# Patient Record
Sex: FEMALE | Race: BLACK OR AFRICAN AMERICAN | NOT HISPANIC OR LATINO | Employment: OTHER | URBAN - METROPOLITAN AREA
[De-identification: names, ages, dates, MRNs, and addresses within clinical notes are randomized per-mention and may not be internally consistent; named-entity substitution may affect disease eponyms.]

---

## 2017-08-30 LAB — CRC RECOMMENDATION EXT: NORMAL

## 2019-05-07 ENCOUNTER — HISTORICAL (OUTPATIENT)
Dept: ADMINISTRATIVE | Facility: HOSPITAL | Age: 69
End: 2019-05-07

## 2019-05-07 LAB
T3RU NFR SERPL: 30 % (ref 31–39)
T4 SERPL-MCNC: 18.6 MCG/DL (ref 4.8–13.9)
TSH SERPL-ACNC: 0.93 MIU/L (ref 0.36–3.74)

## 2020-06-08 ENCOUNTER — HISTORICAL (OUTPATIENT)
Dept: RADIOLOGY | Facility: HOSPITAL | Age: 70
End: 2020-06-08

## 2020-06-18 ENCOUNTER — HISTORICAL (OUTPATIENT)
Dept: RADIOLOGY | Facility: HOSPITAL | Age: 70
End: 2020-06-18

## 2020-06-23 ENCOUNTER — HISTORICAL (OUTPATIENT)
Dept: RADIOLOGY | Facility: HOSPITAL | Age: 70
End: 2020-06-23

## 2020-07-20 ENCOUNTER — HISTORICAL (OUTPATIENT)
Dept: ADMINISTRATIVE | Facility: HOSPITAL | Age: 70
End: 2020-07-20

## 2020-07-20 LAB
ABS NEUT (OLG): 3.77 X10(3)/MCL (ref 2.1–9.2)
ALBUMIN SERPL-MCNC: 3.6 GM/DL (ref 3.4–4.8)
ALBUMIN/GLOB SERPL: 1.1 RATIO (ref 1.1–2)
ALP SERPL-CCNC: 100 UNIT/L (ref 40–150)
ALT SERPL-CCNC: 24 UNIT/L (ref 0–55)
AST SERPL-CCNC: 30 UNIT/L (ref 5–34)
BASOPHILS # BLD AUTO: 0 X10(3)/MCL (ref 0–0.2)
BASOPHILS NFR BLD AUTO: 0.4 %
BILIRUB SERPL-MCNC: 0.5 MG/DL
BILIRUBIN DIRECT+TOT PNL SERPL-MCNC: 0.2 MG/DL (ref 0–0.5)
BILIRUBIN DIRECT+TOT PNL SERPL-MCNC: 0.3 MG/DL (ref 0–0.8)
BUN SERPL-MCNC: 8.4 MG/DL (ref 9.8–20.1)
CALCIUM SERPL-MCNC: 9.2 MG/DL (ref 8.4–10.2)
CHLORIDE SERPL-SCNC: 100 MMOL/L (ref 98–107)
CO2 SERPL-SCNC: 30 MMOL/L (ref 23–31)
CREAT SERPL-MCNC: 0.68 MG/DL (ref 0.55–1.02)
EOSINOPHIL # BLD AUTO: 0.2 X10(3)/MCL (ref 0–0.9)
EOSINOPHIL NFR BLD AUTO: 2.5 %
ERYTHROCYTE [DISTWIDTH] IN BLOOD BY AUTOMATED COUNT: 11.7 % (ref 11.5–17)
GLOBULIN SER-MCNC: 3.2 GM/DL (ref 2.4–3.5)
GLUCOSE SERPL-MCNC: 108 MG/DL (ref 82–115)
HCT VFR BLD AUTO: 36.5 % (ref 37–47)
HGB BLD-MCNC: 11.7 GM/DL (ref 12–16)
LYMPHOCYTES # BLD AUTO: 2.4 X10(3)/MCL (ref 0.6–4.6)
LYMPHOCYTES NFR BLD AUTO: 33.7 %
MCH RBC QN AUTO: 30.4 PG (ref 27–31)
MCHC RBC AUTO-ENTMCNC: 32.1 GM/DL (ref 33–36)
MCV RBC AUTO: 94.8 FL (ref 80–94)
MONOCYTES # BLD AUTO: 0.8 X10(3)/MCL (ref 0.1–1.3)
MONOCYTES NFR BLD AUTO: 11.3 %
NEUTROPHILS # BLD AUTO: 3.8 X10(3)/MCL (ref 2.1–9.2)
NEUTROPHILS NFR BLD AUTO: 52 %
PLATELET # BLD AUTO: 243 X10(3)/MCL (ref 130–400)
PMV BLD AUTO: 9.1 FL (ref 9.4–12.4)
POTASSIUM SERPL-SCNC: 3.4 MMOL/L (ref 3.5–5.1)
PROT SERPL-MCNC: 6.8 GM/DL (ref 5.8–7.6)
RBC # BLD AUTO: 3.85 X10(6)/MCL (ref 4.2–5.4)
SODIUM SERPL-SCNC: 139 MMOL/L (ref 136–145)
TSH SERPL-ACNC: 0.77 UIU/ML (ref 0.35–4.94)
WBC # SPEC AUTO: 7.3 X10(3)/MCL (ref 4.5–11.5)

## 2020-07-30 ENCOUNTER — HISTORICAL (OUTPATIENT)
Dept: RADIOLOGY | Facility: HOSPITAL | Age: 70
End: 2020-07-30

## 2020-09-14 ENCOUNTER — HISTORICAL (OUTPATIENT)
Dept: RADIOLOGY | Facility: HOSPITAL | Age: 70
End: 2020-09-14

## 2020-09-14 LAB — POC CREATININE: 0.7 MG/DL (ref 0.6–1.3)

## 2020-10-07 LAB
ABS NEUT (OLG): 3.41 X10(3)/MCL (ref 2.1–9.2)
BASOPHILS # BLD AUTO: 0 X10(3)/MCL (ref 0–0.2)
BASOPHILS NFR BLD AUTO: 0 %
BUN SERPL-MCNC: 11.2 MG/DL (ref 9.8–20.1)
CALCIUM SERPL-MCNC: 8.9 MG/DL (ref 8.4–10.2)
CHLORIDE SERPL-SCNC: 100 MMOL/L (ref 98–107)
CO2 SERPL-SCNC: 29 MMOL/L (ref 23–31)
CREAT SERPL-MCNC: 0.76 MG/DL (ref 0.55–1.02)
CREAT/UREA NIT SERPL: 15
EOSINOPHIL # BLD AUTO: 0.1 X10(3)/MCL (ref 0–0.9)
EOSINOPHIL NFR BLD AUTO: 2 %
ERYTHROCYTE [DISTWIDTH] IN BLOOD BY AUTOMATED COUNT: 12.1 % (ref 11.5–17)
GLUCOSE SERPL-MCNC: 93 MG/DL (ref 82–115)
HCT VFR BLD AUTO: 37.9 % (ref 37–47)
HGB BLD-MCNC: 12 GM/DL (ref 12–16)
LYMPHOCYTES # BLD AUTO: 2.1 X10(3)/MCL (ref 0.6–4.6)
LYMPHOCYTES NFR BLD AUTO: 33 %
MCH RBC QN AUTO: 30.7 PG (ref 27–31)
MCHC RBC AUTO-ENTMCNC: 31.7 GM/DL (ref 33–36)
MCV RBC AUTO: 96.9 FL (ref 80–94)
MONOCYTES # BLD AUTO: 0.7 X10(3)/MCL (ref 0.1–1.3)
MONOCYTES NFR BLD AUTO: 11 %
NEUTROPHILS # BLD AUTO: 3.41 X10(3)/MCL (ref 2.1–9.2)
NEUTROPHILS NFR BLD AUTO: 53 %
PLATELET # BLD AUTO: 202 X10(3)/MCL (ref 130–400)
PMV BLD AUTO: 11.2 FL (ref 9.4–12.4)
POTASSIUM SERPL-SCNC: 3.6 MMOL/L (ref 3.5–5.1)
RBC # BLD AUTO: 3.91 X10(6)/MCL (ref 4.2–5.4)
SODIUM SERPL-SCNC: 140 MMOL/L (ref 136–145)
WBC # SPEC AUTO: 6.4 X10(3)/MCL (ref 4.5–11.5)

## 2020-10-12 ENCOUNTER — HISTORICAL (OUTPATIENT)
Dept: SURGERY | Facility: HOSPITAL | Age: 70
End: 2020-10-12

## 2020-10-14 ENCOUNTER — HISTORICAL (OUTPATIENT)
Dept: RADIOLOGY | Facility: HOSPITAL | Age: 70
End: 2020-10-14

## 2021-02-01 ENCOUNTER — HISTORICAL (OUTPATIENT)
Dept: RADIOLOGY | Facility: HOSPITAL | Age: 71
End: 2021-02-01

## 2021-02-18 LAB
ABS NEUT (OLG): 6.47 X10(3)/MCL (ref 2.1–9.2)
BASOPHILS # BLD AUTO: 0 X10(3)/MCL (ref 0–0.2)
BASOPHILS NFR BLD AUTO: 0 %
BUN SERPL-MCNC: 6.8 MG/DL (ref 9.8–20.1)
CALCIUM SERPL-MCNC: 9.4 MG/DL (ref 8.4–10.2)
CHLORIDE SERPL-SCNC: 98 MMOL/L (ref 98–107)
CO2 SERPL-SCNC: 28 MMOL/L (ref 23–31)
CREAT SERPL-MCNC: 0.73 MG/DL (ref 0.55–1.02)
CREAT/UREA NIT SERPL: 9
EOSINOPHIL # BLD AUTO: 0.2 X10(3)/MCL (ref 0–0.9)
EOSINOPHIL NFR BLD AUTO: 2 %
ERYTHROCYTE [DISTWIDTH] IN BLOOD BY AUTOMATED COUNT: 13.5 % (ref 11.5–17)
GLUCOSE SERPL-MCNC: 94 MG/DL (ref 82–115)
HCT VFR BLD AUTO: 34.1 % (ref 37–47)
HGB BLD-MCNC: 10.5 GM/DL (ref 12–16)
LYMPHOCYTES # BLD AUTO: 1.7 X10(3)/MCL (ref 0.6–4.6)
LYMPHOCYTES NFR BLD AUTO: 18 %
MCH RBC QN AUTO: 31.4 PG (ref 27–31)
MCHC RBC AUTO-ENTMCNC: 30.8 GM/DL (ref 33–36)
MCV RBC AUTO: 102.1 FL (ref 80–94)
MONOCYTES # BLD AUTO: 1 X10(3)/MCL (ref 0.1–1.3)
MONOCYTES NFR BLD AUTO: 11 %
NEUTROPHILS # BLD AUTO: 6.47 X10(3)/MCL (ref 2.1–9.2)
NEUTROPHILS NFR BLD AUTO: 68 %
PLATELET # BLD AUTO: 284 X10(3)/MCL (ref 130–400)
PMV BLD AUTO: 10.1 FL (ref 9.4–12.4)
POTASSIUM SERPL-SCNC: 3.4 MMOL/L (ref 3.5–5.1)
RBC # BLD AUTO: 3.34 X10(6)/MCL (ref 4.2–5.4)
SODIUM SERPL-SCNC: 136 MMOL/L (ref 136–145)
WBC # SPEC AUTO: 9.4 X10(3)/MCL (ref 4.5–11.5)

## 2021-02-22 ENCOUNTER — HISTORICAL (OUTPATIENT)
Dept: SURGERY | Facility: HOSPITAL | Age: 71
End: 2021-02-22

## 2021-03-21 ENCOUNTER — HOSPITAL ENCOUNTER (OUTPATIENT)
Facility: HOSPITAL | Age: 71
Discharge: HOME | End: 2021-03-22
Attending: EMERGENCY MEDICINE | Admitting: EMERGENCY MEDICINE
Payer: MEDICARE

## 2021-03-21 DIAGNOSIS — I26.99 PULMONARY EMBOLISM: ICD-10-CM

## 2021-03-21 DIAGNOSIS — R07.9 CHEST PAIN: ICD-10-CM

## 2021-03-21 DIAGNOSIS — E87.6 HYPOKALEMIA: ICD-10-CM

## 2021-03-21 DIAGNOSIS — I26.99 ACUTE PULMONARY EMBOLISM WITHOUT ACUTE COR PULMONALE, UNSPECIFIED PULMONARY EMBOLISM TYPE: Primary | ICD-10-CM
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
## 2021-03-21 DIAGNOSIS — E83.42 HYPOMAGNESEMIA: ICD-10-CM

## 2021-03-21 PROBLEM — R29.6 FALLS: Status: ACTIVE | Noted: 2021-03-21

## 2021-03-21 LAB
ALBUMIN SERPL BCP-MCNC: 3.1 G/DL (ref 3.5–5.2)
ALP SERPL-CCNC: 100 U/L (ref 55–135)
ALT SERPL W/O P-5'-P-CCNC: 27 U/L (ref 10–44)
AMPHET+METHAMPHET UR QL: NEGATIVE
ANION GAP SERPL CALC-SCNC: 10 MMOL/L (ref 8–16)
AST SERPL-CCNC: 25 U/L (ref 10–40)
BARBITURATES UR QL SCN>200 NG/ML: NEGATIVE
BASOPHILS # BLD AUTO: 0.04 K/UL (ref 0–0.2)
BASOPHILS NFR BLD: 0.6 % (ref 0–1.9)
BENZODIAZ UR QL SCN>200 NG/ML: NEGATIVE
BILIRUB SERPL-MCNC: 0.3 MG/DL (ref 0.1–1)
BILIRUB UR QL STRIP: NEGATIVE
BNP SERPL-MCNC: 11 PG/ML (ref 0–99)
BUN SERPL-MCNC: 6 MG/DL (ref 8–23)
BZE UR QL SCN: NEGATIVE
CALCIUM SERPL-MCNC: 9.7 MG/DL (ref 8.7–10.5)
CANNABINOIDS UR QL SCN: NEGATIVE
CHLORIDE SERPL-SCNC: 102 MMOL/L (ref 95–110)
CK SERPL-CCNC: 137 U/L (ref 20–180)
CLARITY UR REFRACT.AUTO: CLEAR
CO2 SERPL-SCNC: 24 MMOL/L (ref 23–29)
COLOR UR AUTO: COLORLESS
CREAT SERPL-MCNC: 0.8 MG/DL (ref 0.5–1.4)
CREAT UR-MCNC: 16 MG/DL (ref 15–325)
CTP QC/QA: YES
D DIMER PPP IA.FEU-MCNC: 10.6 MG/L FEU
DIFFERENTIAL METHOD: ABNORMAL
EOSINOPHIL # BLD AUTO: 0.3 K/UL (ref 0–0.5)
EOSINOPHIL NFR BLD: 5.2 % (ref 0–8)
ERYTHROCYTE [DISTWIDTH] IN BLOOD BY AUTOMATED COUNT: 12.9 % (ref 11.5–14.5)
EST. GFR  (AFRICAN AMERICAN): >60 ML/MIN/1.73 M^2
EST. GFR  (NON AFRICAN AMERICAN): >60 ML/MIN/1.73 M^2
ETHANOL SERPL-MCNC: <10 MG/DL
ETHANOL UR-MCNC: <10 MG/DL
GLUCOSE SERPL-MCNC: 110 MG/DL (ref 70–110)
GLUCOSE UR QL STRIP: NEGATIVE
HCT VFR BLD AUTO: 33 % (ref 37–48.5)
HGB BLD-MCNC: 10.7 G/DL (ref 12–16)
HGB UR QL STRIP: NEGATIVE
IMM GRANULOCYTES # BLD AUTO: 0.02 K/UL (ref 0–0.04)
IMM GRANULOCYTES NFR BLD AUTO: 0.3 % (ref 0–0.5)
KETONES UR QL STRIP: NEGATIVE
LEUKOCYTE ESTERASE UR QL STRIP: NEGATIVE
LYMPHOCYTES # BLD AUTO: 2.4 K/UL (ref 1–4.8)
LYMPHOCYTES NFR BLD: 37.2 % (ref 18–48)
MAGNESIUM SERPL-MCNC: 1.5 MG/DL (ref 1.6–2.6)
MCH RBC QN AUTO: 30.7 PG (ref 27–31)
MCHC RBC AUTO-ENTMCNC: 32.4 G/DL (ref 32–36)
MCV RBC AUTO: 95 FL (ref 82–98)
METHADONE UR QL SCN>300 NG/ML: NEGATIVE
MONOCYTES # BLD AUTO: 1 K/UL (ref 0.3–1)
MONOCYTES NFR BLD: 15.5 % (ref 4–15)
NEUTROPHILS # BLD AUTO: 2.7 K/UL (ref 1.8–7.7)
NEUTROPHILS NFR BLD: 41.2 % (ref 38–73)
NITRITE UR QL STRIP: NEGATIVE
NRBC BLD-RTO: 0 /100 WBC
OPIATES UR QL SCN: NEGATIVE
PCP UR QL SCN>25 NG/ML: NEGATIVE
PH UR STRIP: 8 [PH] (ref 5–8)
PLATELET # BLD AUTO: 274 K/UL (ref 150–350)
PMV BLD AUTO: 9.2 FL (ref 9.2–12.9)
POTASSIUM SERPL-SCNC: 3.2 MMOL/L (ref 3.5–5.1)
PROT SERPL-MCNC: 6.8 G/DL (ref 6–8.4)
PROT UR QL STRIP: NEGATIVE
RBC # BLD AUTO: 3.48 M/UL (ref 4–5.4)
SARS-COV-2 RDRP RESP QL NAA+PROBE: NEGATIVE
SODIUM SERPL-SCNC: 136 MMOL/L (ref 136–145)
SP GR UR STRIP: 1 (ref 1–1.03)
TOXICOLOGY INFORMATION: NORMAL
TROPONIN I SERPL DL<=0.01 NG/ML-MCNC: <0.006 NG/ML (ref 0–0.03)
TSH SERPL DL<=0.005 MIU/L-ACNC: 0.94 UIU/ML (ref 0.4–4)
URN SPEC COLLECT METH UR: ABNORMAL
WBC # BLD AUTO: 6.53 K/UL (ref 3.9–12.7)

## 2021-03-21 PROCEDURE — 25000242 PHARM REV CODE 250 ALT 637 W/ HCPCS: Performed by: STUDENT IN AN ORGANIZED HEALTH CARE EDUCATION/TRAINING PROGRAM

## 2021-03-21 PROCEDURE — G0378 HOSPITAL OBSERVATION PER HR: HCPCS

## 2021-03-21 PROCEDURE — 25500020 PHARM REV CODE 255: Performed by: EMERGENCY MEDICINE

## 2021-03-21 PROCEDURE — 99220 PR INITIAL OBSERVATION CARE,LEVL III: CPT | Mod: ,,, | Performed by: STUDENT IN AN ORGANIZED HEALTH CARE EDUCATION/TRAINING PROGRAM

## 2021-03-21 PROCEDURE — 36000 PLACE NEEDLE IN VEIN: CPT | Mod: 59

## 2021-03-21 PROCEDURE — 82077 ASSAY SPEC XCP UR&BREATH IA: CPT | Performed by: EMERGENCY MEDICINE

## 2021-03-21 PROCEDURE — 93010 EKG 12-LEAD: ICD-10-PCS | Mod: ,,, | Performed by: INTERNAL MEDICINE

## 2021-03-21 PROCEDURE — 84484 ASSAY OF TROPONIN QUANT: CPT | Performed by: EMERGENCY MEDICINE

## 2021-03-21 PROCEDURE — 93005 ELECTROCARDIOGRAM TRACING: CPT

## 2021-03-21 PROCEDURE — 86803 HEPATITIS C AB TEST: CPT | Performed by: EMERGENCY MEDICINE

## 2021-03-21 PROCEDURE — 84443 ASSAY THYROID STIM HORMONE: CPT | Performed by: EMERGENCY MEDICINE

## 2021-03-21 PROCEDURE — 25000003 PHARM REV CODE 250: Performed by: EMERGENCY MEDICINE

## 2021-03-21 PROCEDURE — 83735 ASSAY OF MAGNESIUM: CPT | Performed by: EMERGENCY MEDICINE

## 2021-03-21 PROCEDURE — 83880 ASSAY OF NATRIURETIC PEPTIDE: CPT | Performed by: EMERGENCY MEDICINE

## 2021-03-21 PROCEDURE — 99220 PR INITIAL OBSERVATION CARE,LEVL III: ICD-10-PCS | Mod: ,,, | Performed by: STUDENT IN AN ORGANIZED HEALTH CARE EDUCATION/TRAINING PROGRAM

## 2021-03-21 PROCEDURE — 82550 ASSAY OF CK (CPK): CPT | Performed by: EMERGENCY MEDICINE

## 2021-03-21 PROCEDURE — 94640 AIRWAY INHALATION TREATMENT: CPT

## 2021-03-21 PROCEDURE — 85379 FIBRIN DEGRADATION QUANT: CPT | Performed by: EMERGENCY MEDICINE

## 2021-03-21 PROCEDURE — U0002 COVID-19 LAB TEST NON-CDC: HCPCS | Performed by: EMERGENCY MEDICINE

## 2021-03-21 PROCEDURE — 81003 URINALYSIS AUTO W/O SCOPE: CPT | Performed by: EMERGENCY MEDICINE

## 2021-03-21 PROCEDURE — 80307 DRUG TEST PRSMV CHEM ANLYZR: CPT | Performed by: EMERGENCY MEDICINE

## 2021-03-21 PROCEDURE — 99285 EMERGENCY DEPT VISIT HI MDM: CPT | Mod: CS,,, | Performed by: EMERGENCY MEDICINE

## 2021-03-21 PROCEDURE — 99285 EMERGENCY DEPT VISIT HI MDM: CPT | Mod: 25

## 2021-03-21 PROCEDURE — 93010 ELECTROCARDIOGRAM REPORT: CPT | Mod: ,,, | Performed by: INTERNAL MEDICINE

## 2021-03-21 PROCEDURE — 85025 COMPLETE CBC W/AUTO DIFF WBC: CPT | Performed by: EMERGENCY MEDICINE

## 2021-03-21 PROCEDURE — 94761 N-INVAS EAR/PLS OXIMETRY MLT: CPT

## 2021-03-21 PROCEDURE — 80053 COMPREHEN METABOLIC PANEL: CPT | Performed by: EMERGENCY MEDICINE

## 2021-03-21 PROCEDURE — 25000003 PHARM REV CODE 250: Performed by: STUDENT IN AN ORGANIZED HEALTH CARE EDUCATION/TRAINING PROGRAM

## 2021-03-21 PROCEDURE — 99285 PR EMERGENCY DEPT VISIT,LEVEL V: ICD-10-PCS | Mod: CS,,, | Performed by: EMERGENCY MEDICINE

## 2021-03-21 RX ORDER — INDAPAMIDE 2.5 MG/1
2.5 TABLET ORAL DAILY
COMMUNITY
Start: 2021-03-16 | End: 2022-08-08 | Stop reason: SDUPTHER

## 2021-03-21 RX ORDER — INDAPAMIDE 2.5 MG/1
2.5 TABLET ORAL DAILY
Status: DISCONTINUED | OUTPATIENT
Start: 2021-03-21 | End: 2021-03-22 | Stop reason: HOSPADM

## 2021-03-21 RX ORDER — LORATADINE 10 MG/1
10 TABLET ORAL
Status: ON HOLD | COMMUNITY
Start: 2021-03-04 | End: 2021-03-22

## 2021-03-21 RX ORDER — IBUPROFEN 200 MG
16 TABLET ORAL
Status: DISCONTINUED | OUTPATIENT
Start: 2021-03-21 | End: 2021-03-22 | Stop reason: HOSPADM

## 2021-03-21 RX ORDER — SODIUM CHLORIDE 0.9 % (FLUSH) 0.9 %
10 SYRINGE (ML) INJECTION
Status: DISCONTINUED | OUTPATIENT
Start: 2021-03-21 | End: 2021-03-22 | Stop reason: HOSPADM

## 2021-03-21 RX ORDER — CETIRIZINE HYDROCHLORIDE 5 MG/1
10 TABLET ORAL DAILY
Status: DISCONTINUED | OUTPATIENT
Start: 2021-03-21 | End: 2021-03-22 | Stop reason: HOSPADM

## 2021-03-21 RX ORDER — OXYBUTYNIN CHLORIDE 5 MG/1
5 TABLET, EXTENDED RELEASE ORAL DAILY
Status: DISCONTINUED | OUTPATIENT
Start: 2021-03-21 | End: 2021-03-22 | Stop reason: HOSPADM

## 2021-03-21 RX ORDER — BENZTROPINE MESYLATE 0.5 MG/1
2 TABLET ORAL 2 TIMES DAILY
Status: DISCONTINUED | OUTPATIENT
Start: 2021-03-21 | End: 2021-03-22 | Stop reason: HOSPADM

## 2021-03-21 RX ORDER — METOPROLOL SUCCINATE 50 MG/1
50 TABLET, EXTENDED RELEASE ORAL DAILY
Status: DISCONTINUED | OUTPATIENT
Start: 2021-03-21 | End: 2021-03-22 | Stop reason: HOSPADM

## 2021-03-21 RX ORDER — GLUCAGON 1 MG
1 KIT INJECTION
Status: DISCONTINUED | OUTPATIENT
Start: 2021-03-21 | End: 2021-03-22 | Stop reason: HOSPADM

## 2021-03-21 RX ORDER — LANOLIN ALCOHOL/MO/W.PET/CERES
400 CREAM (GRAM) TOPICAL ONCE
Status: COMPLETED | OUTPATIENT
Start: 2021-03-21 | End: 2021-03-21

## 2021-03-21 RX ORDER — LOSARTAN POTASSIUM 50 MG/1
50 TABLET ORAL DAILY
COMMUNITY
Start: 2021-02-22 | End: 2022-05-10 | Stop reason: SDUPTHER

## 2021-03-21 RX ORDER — METOPROLOL SUCCINATE 50 MG/1
50 TABLET, EXTENDED RELEASE ORAL
COMMUNITY
Start: 2020-07-07 | End: 2022-05-10 | Stop reason: SDUPTHER

## 2021-03-21 RX ORDER — FLUTICASONE PROPIONATE 50 MCG
2 SPRAY, SUSPENSION (ML) NASAL DAILY
COMMUNITY
Start: 2021-01-27

## 2021-03-21 RX ORDER — MONTELUKAST SODIUM 10 MG/1
10 TABLET ORAL DAILY
COMMUNITY
Start: 2020-10-12 | End: 2022-05-10 | Stop reason: SDUPTHER

## 2021-03-21 RX ORDER — ATORVASTATIN CALCIUM 20 MG/1
40 TABLET, FILM COATED ORAL DAILY
Status: DISCONTINUED | OUTPATIENT
Start: 2021-03-21 | End: 2021-03-22 | Stop reason: HOSPADM

## 2021-03-21 RX ORDER — GABAPENTIN 300 MG/1
300 CAPSULE ORAL 3 TIMES DAILY
Status: DISCONTINUED | OUTPATIENT
Start: 2021-03-21 | End: 2021-03-22 | Stop reason: HOSPADM

## 2021-03-21 RX ORDER — TALC
6 POWDER (GRAM) TOPICAL NIGHTLY PRN
Status: DISCONTINUED | OUTPATIENT
Start: 2021-03-21 | End: 2021-03-22 | Stop reason: HOSPADM

## 2021-03-21 RX ORDER — PANTOPRAZOLE SODIUM 40 MG/1
40 TABLET, DELAYED RELEASE ORAL DAILY
COMMUNITY
Start: 2021-02-16 | End: 2022-05-10 | Stop reason: SDUPTHER

## 2021-03-21 RX ORDER — GABAPENTIN 300 MG/1
300 CAPSULE ORAL 3 TIMES DAILY
COMMUNITY
Start: 2020-07-07 | End: 2023-01-10

## 2021-03-21 RX ORDER — HALOPERIDOL 5 MG/1
5 TABLET ORAL 2 TIMES DAILY
COMMUNITY
Start: 2021-01-22

## 2021-03-21 RX ORDER — ATORVASTATIN CALCIUM 40 MG/1
40 TABLET, FILM COATED ORAL DAILY
COMMUNITY
Start: 2020-12-31 | End: 2022-08-08 | Stop reason: SDUPTHER

## 2021-03-21 RX ORDER — TRAZODONE HYDROCHLORIDE 50 MG/1
50 TABLET ORAL NIGHTLY PRN
Status: DISCONTINUED | OUTPATIENT
Start: 2021-03-21 | End: 2021-03-22 | Stop reason: HOSPADM

## 2021-03-21 RX ORDER — POTASSIUM CHLORIDE 750 MG/1
40 CAPSULE, EXTENDED RELEASE ORAL ONCE
Status: COMPLETED | OUTPATIENT
Start: 2021-03-21 | End: 2021-03-21

## 2021-03-21 RX ORDER — POTASSIUM CHLORIDE 20 MEQ/1
40 TABLET, EXTENDED RELEASE ORAL
Status: COMPLETED | OUTPATIENT
Start: 2021-03-21 | End: 2021-03-21

## 2021-03-21 RX ORDER — IBUPROFEN 200 MG
24 TABLET ORAL
Status: DISCONTINUED | OUTPATIENT
Start: 2021-03-21 | End: 2021-03-22 | Stop reason: HOSPADM

## 2021-03-21 RX ORDER — MONTELUKAST SODIUM 10 MG/1
10 TABLET ORAL DAILY
Status: DISCONTINUED | OUTPATIENT
Start: 2021-03-21 | End: 2021-03-22 | Stop reason: HOSPADM

## 2021-03-21 RX ORDER — FLUTICASONE FUROATE 100 UG/1
1 POWDER RESPIRATORY (INHALATION) DAILY
COMMUNITY
Start: 2020-07-07

## 2021-03-21 RX ORDER — PANTOPRAZOLE SODIUM 40 MG/1
40 TABLET, DELAYED RELEASE ORAL DAILY
Status: DISCONTINUED | OUTPATIENT
Start: 2021-03-21 | End: 2021-03-22 | Stop reason: HOSPADM

## 2021-03-21 RX ORDER — FLUTICASONE PROPIONATE 50 MCG
2 SPRAY, SUSPENSION (ML) NASAL DAILY
Status: DISCONTINUED | OUTPATIENT
Start: 2021-03-21 | End: 2021-03-22 | Stop reason: HOSPADM

## 2021-03-21 RX ORDER — BENZTROPINE MESYLATE 2 MG/1
2 TABLET ORAL NIGHTLY
Status: ON HOLD | COMMUNITY
Start: 2021-01-27 | End: 2023-01-25 | Stop reason: HOSPADM

## 2021-03-21 RX ORDER — HALOPERIDOL 5 MG/1
5 TABLET ORAL 2 TIMES DAILY
Status: DISCONTINUED | OUTPATIENT
Start: 2021-03-21 | End: 2021-03-22 | Stop reason: HOSPADM

## 2021-03-21 RX ORDER — LOSARTAN POTASSIUM 50 MG/1
50 TABLET ORAL DAILY
Status: DISCONTINUED | OUTPATIENT
Start: 2021-03-21 | End: 2021-03-22 | Stop reason: HOSPADM

## 2021-03-21 RX ADMIN — FLUTICASONE PROPIONATE 100 MCG: 50 SPRAY, METERED NASAL at 09:03

## 2021-03-21 RX ADMIN — HALOPERIDOL 5 MG: 5 TABLET ORAL at 09:03

## 2021-03-21 RX ADMIN — BENZTROPINE MESYLATE 2 MG: 0.5 TABLET ORAL at 09:03

## 2021-03-21 RX ADMIN — APIXABAN 10 MG: 5 TABLET, FILM COATED ORAL at 04:03

## 2021-03-21 RX ADMIN — PANTOPRAZOLE SODIUM 40 MG: 40 TABLET, DELAYED RELEASE ORAL at 09:03

## 2021-03-21 RX ADMIN — FLUTICASONE FUROATE 1 PUFF: 100 POWDER RESPIRATORY (INHALATION) at 09:03

## 2021-03-21 RX ADMIN — APIXABAN 10 MG: 5 TABLET, FILM COATED ORAL at 09:03

## 2021-03-21 RX ADMIN — MONTELUKAST 10 MG: 10 TABLET, FILM COATED ORAL at 09:03

## 2021-03-21 RX ADMIN — GABAPENTIN 300 MG: 300 CAPSULE ORAL at 09:03

## 2021-03-21 RX ADMIN — IOHEXOL 75 ML: 350 INJECTION, SOLUTION INTRAVENOUS at 03:03

## 2021-03-21 RX ADMIN — POTASSIUM CHLORIDE 40 MEQ: 10 CAPSULE, COATED, EXTENDED RELEASE ORAL at 09:03

## 2021-03-21 RX ADMIN — METOPROLOL SUCCINATE 50 MG: 50 TABLET, EXTENDED RELEASE ORAL at 09:03

## 2021-03-21 RX ADMIN — Medication 400 MG: at 03:03

## 2021-03-21 RX ADMIN — LOSARTAN POTASSIUM 50 MG: 50 TABLET, FILM COATED ORAL at 09:03

## 2021-03-21 RX ADMIN — CETIRIZINE HYDROCHLORIDE 10 MG: 5 TABLET ORAL at 09:03

## 2021-03-21 RX ADMIN — POTASSIUM CHLORIDE 40 MEQ: 1500 TABLET, EXTENDED RELEASE ORAL at 03:03

## 2021-03-21 RX ADMIN — GABAPENTIN 300 MG: 300 CAPSULE ORAL at 06:03

## 2021-03-21 RX ADMIN — ATORVASTATIN CALCIUM 40 MG: 20 TABLET, FILM COATED ORAL at 09:03

## 2021-03-21 RX ADMIN — INDAPAMIDE 2.5 MG: 2.5 TABLET, FILM COATED ORAL at 09:03

## 2021-03-21 RX ADMIN — OXYBUTYNIN CHLORIDE 5 MG: 5 TABLET, EXTENDED RELEASE ORAL at 09:03

## 2021-03-22 VITALS
WEIGHT: 186 LBS | BODY MASS INDEX: 28.19 KG/M2 | TEMPERATURE: 99 F | OXYGEN SATURATION: 95 % | HEART RATE: 81 BPM | RESPIRATION RATE: 18 BRPM | HEIGHT: 68 IN | DIASTOLIC BLOOD PRESSURE: 69 MMHG | SYSTOLIC BLOOD PRESSURE: 105 MMHG

## 2021-03-22 LAB
ALBUMIN SERPL BCP-MCNC: 2.7 G/DL (ref 3.5–5.2)
ALP SERPL-CCNC: 86 U/L (ref 55–135)
ALT SERPL W/O P-5'-P-CCNC: 19 U/L (ref 10–44)
ANION GAP SERPL CALC-SCNC: 10 MMOL/L (ref 8–16)
ASCENDING AORTA: 3.01 CM
AST SERPL-CCNC: 22 U/L (ref 10–40)
AV INDEX (PROSTH): 0.83
AV MEAN GRADIENT: 4 MMHG
AV PEAK GRADIENT: 7 MMHG
AV VALVE AREA: 2.7 CM2
AV VELOCITY RATIO: 0.84
BASOPHILS # BLD AUTO: 0.04 K/UL (ref 0–0.2)
BASOPHILS NFR BLD: 0.5 % (ref 0–1.9)
BILIRUB SERPL-MCNC: 0.3 MG/DL (ref 0.1–1)
BSA FOR ECHO PROCEDURE: 2.01 M2
BUN SERPL-MCNC: 12 MG/DL (ref 8–23)
CALCIUM SERPL-MCNC: 8.5 MG/DL (ref 8.7–10.5)
CHLORIDE SERPL-SCNC: 106 MMOL/L (ref 95–110)
CO2 SERPL-SCNC: 24 MMOL/L (ref 23–29)
CREAT SERPL-MCNC: 0.7 MG/DL (ref 0.5–1.4)
CV ECHO LV RWT: 0.33 CM
DIFFERENTIAL METHOD: ABNORMAL
DOP CALC AO PEAK VEL: 1.34 M/S
DOP CALC AO VTI: 26.49 CM
DOP CALC LVOT AREA: 3.2 CM2
DOP CALC LVOT DIAMETER: 2.03 CM
DOP CALC LVOT PEAK VEL: 1.12 M/S
DOP CALC LVOT STROKE VOLUME: 71.43 CM3
DOP CALCLVOT PEAK VEL VTI: 22.08 CM
E WAVE DECELERATION TIME: 173 MSEC
E/A RATIO: 0.71
E/E' RATIO: 8 M/S
ECHO LV POSTERIOR WALL: 0.82 CM (ref 0.6–1.1)
EOSINOPHIL # BLD AUTO: 0.4 K/UL (ref 0–0.5)
EOSINOPHIL NFR BLD: 4.4 % (ref 0–8)
ERYTHROCYTE [DISTWIDTH] IN BLOOD BY AUTOMATED COUNT: 13.2 % (ref 11.5–14.5)
EST. GFR  (AFRICAN AMERICAN): >60 ML/MIN/1.73 M^2
EST. GFR  (NON AFRICAN AMERICAN): >60 ML/MIN/1.73 M^2
FRACTIONAL SHORTENING: 32 % (ref 28–44)
GLUCOSE SERPL-MCNC: 96 MG/DL (ref 70–110)
HCT VFR BLD AUTO: 33.4 % (ref 37–48.5)
HCV AB SERPL QL IA: NEGATIVE
HGB BLD-MCNC: 10.2 G/DL (ref 12–16)
IMM GRANULOCYTES # BLD AUTO: 0.02 K/UL (ref 0–0.04)
IMM GRANULOCYTES NFR BLD AUTO: 0.2 % (ref 0–0.5)
INTERVENTRICULAR SEPTUM: 0.73 CM (ref 0.6–1.1)
IVRT: 119.89 MSEC
LA MAJOR: 4.63 CM
LA MINOR: 4.59 CM
LA WIDTH: 3.47 CM
LEFT ATRIUM SIZE: 2.9 CM
LEFT ATRIUM VOLUME INDEX MOD: 21.1 ML/M2
LEFT ATRIUM VOLUME INDEX: 19.9 ML/M2
LEFT ATRIUM VOLUME MOD: 41.75 CM3
LEFT ATRIUM VOLUME: 39.43 CM3
LEFT INTERNAL DIMENSION IN SYSTOLE: 3.36 CM (ref 2.1–4)
LEFT VENTRICLE DIASTOLIC VOLUME INDEX: 57.39 ML/M2
LEFT VENTRICLE DIASTOLIC VOLUME: 113.64 ML
LEFT VENTRICLE MASS INDEX: 64 G/M2
LEFT VENTRICLE SYSTOLIC VOLUME INDEX: 23.2 ML/M2
LEFT VENTRICLE SYSTOLIC VOLUME: 45.95 ML
LEFT VENTRICULAR INTERNAL DIMENSION IN DIASTOLE: 4.92 CM (ref 3.5–6)
LEFT VENTRICULAR MASS: 126.85 G
LV LATERAL E/E' RATIO: 6 M/S
LV SEPTAL E/E' RATIO: 12 M/S
LYMPHOCYTES # BLD AUTO: 2.6 K/UL (ref 1–4.8)
LYMPHOCYTES NFR BLD: 31.2 % (ref 18–48)
MCH RBC QN AUTO: 29.7 PG (ref 27–31)
MCHC RBC AUTO-ENTMCNC: 30.5 G/DL (ref 32–36)
MCV RBC AUTO: 97 FL (ref 82–98)
MONOCYTES # BLD AUTO: 1.3 K/UL (ref 0.3–1)
MONOCYTES NFR BLD: 15.2 % (ref 4–15)
MV PEAK A VEL: 1.02 M/S
MV PEAK E VEL: 0.72 M/S
MV STENOSIS PRESSURE HALF TIME: 50.17 MS
MV VALVE AREA P 1/2 METHOD: 4.39 CM2
NEUTROPHILS # BLD AUTO: 4 K/UL (ref 1.8–7.7)
NEUTROPHILS NFR BLD: 48.5 % (ref 38–73)
NRBC BLD-RTO: 0 /100 WBC
PISA TR MAX VEL: 2.4 M/S
PLATELET # BLD AUTO: 257 K/UL (ref 150–350)
PMV BLD AUTO: 9.4 FL (ref 9.2–12.9)
POTASSIUM SERPL-SCNC: 3.2 MMOL/L (ref 3.5–5.1)
PROT SERPL-MCNC: 6 G/DL (ref 6–8.4)
RA MAJOR: 4.19 CM
RA PRESSURE: 3 MMHG
RA WIDTH: 2.84 CM
RBC # BLD AUTO: 3.43 M/UL (ref 4–5.4)
RIGHT VENTRICULAR END-DIASTOLIC DIMENSION: 2.86 CM
RV TISSUE DOPPLER FREE WALL SYSTOLIC VELOCITY 1 (APICAL 4 CHAMBER VIEW): 10.62 CM/S
SINUS: 3.19 CM
SODIUM SERPL-SCNC: 140 MMOL/L (ref 136–145)
STJ: 3.13 CM
TDI LATERAL: 0.12 M/S
TDI SEPTAL: 0.06 M/S
TDI: 0.09 M/S
TR MAX PG: 23 MMHG
TRICUSPID ANNULAR PLANE SYSTOLIC EXCURSION: 1.49 CM
TV REST PULMONARY ARTERY PRESSURE: 26 MMHG
WBC # BLD AUTO: 8.2 K/UL (ref 3.9–12.7)

## 2021-03-22 PROCEDURE — 97161 PT EVAL LOW COMPLEX 20 MIN: CPT

## 2021-03-22 PROCEDURE — 97165 OT EVAL LOW COMPLEX 30 MIN: CPT

## 2021-03-22 PROCEDURE — G0378 HOSPITAL OBSERVATION PER HR: HCPCS

## 2021-03-22 PROCEDURE — 94761 N-INVAS EAR/PLS OXIMETRY MLT: CPT

## 2021-03-22 PROCEDURE — 80053 COMPREHEN METABOLIC PANEL: CPT | Performed by: STUDENT IN AN ORGANIZED HEALTH CARE EDUCATION/TRAINING PROGRAM

## 2021-03-22 PROCEDURE — 36415 COLL VENOUS BLD VENIPUNCTURE: CPT | Performed by: STUDENT IN AN ORGANIZED HEALTH CARE EDUCATION/TRAINING PROGRAM

## 2021-03-22 PROCEDURE — 99217 PR OBSERVATION CARE DISCHARGE: ICD-10-PCS | Mod: ,,, | Performed by: STUDENT IN AN ORGANIZED HEALTH CARE EDUCATION/TRAINING PROGRAM

## 2021-03-22 PROCEDURE — 85025 COMPLETE CBC W/AUTO DIFF WBC: CPT | Performed by: STUDENT IN AN ORGANIZED HEALTH CARE EDUCATION/TRAINING PROGRAM

## 2021-03-22 PROCEDURE — 99217 PR OBSERVATION CARE DISCHARGE: CPT | Mod: ,,, | Performed by: STUDENT IN AN ORGANIZED HEALTH CARE EDUCATION/TRAINING PROGRAM

## 2021-03-22 PROCEDURE — 25000003 PHARM REV CODE 250: Performed by: STUDENT IN AN ORGANIZED HEALTH CARE EDUCATION/TRAINING PROGRAM

## 2021-03-22 PROCEDURE — 97530 THERAPEUTIC ACTIVITIES: CPT

## 2021-03-22 PROCEDURE — 94640 AIRWAY INHALATION TREATMENT: CPT

## 2021-03-22 PROCEDURE — 97116 GAIT TRAINING THERAPY: CPT

## 2021-03-22 RX ORDER — POTASSIUM CHLORIDE 1500 MG/1
20 TABLET, EXTENDED RELEASE ORAL 2 TIMES DAILY
COMMUNITY
Start: 2021-02-16 | End: 2022-05-10 | Stop reason: SDUPTHER

## 2021-03-22 RX ORDER — BACLOFEN 10 MG/1
10 TABLET ORAL DAILY
COMMUNITY
Start: 2021-03-16 | End: 2023-01-10

## 2021-03-22 RX ORDER — POTASSIUM CHLORIDE 750 MG/1
40 CAPSULE, EXTENDED RELEASE ORAL ONCE
Status: COMPLETED | OUTPATIENT
Start: 2021-03-22 | End: 2021-03-22

## 2021-03-22 RX ORDER — IRON,CARBONYL/ASCORBIC ACID 100-250 MG
1 TABLET ORAL DAILY
COMMUNITY
Start: 2021-02-16 | End: 2023-01-10

## 2021-03-22 RX ORDER — DEXTROMETHORPHAN HYDROBROMIDE, GUAIFENESIN 5; 100 MG/5ML; MG/5ML
650 LIQUID ORAL NIGHTLY PRN
COMMUNITY
End: 2022-08-08

## 2021-03-22 RX ORDER — FEXOFENADINE HCL 60 MG
60 TABLET ORAL DAILY
COMMUNITY
End: 2023-01-10

## 2021-03-22 RX ORDER — GUAIFENESIN 600 MG/1
1200 TABLET, EXTENDED RELEASE ORAL 2 TIMES DAILY PRN
COMMUNITY
End: 2023-01-10

## 2021-03-22 RX ORDER — ALENDRONATE SODIUM 70 MG/1
70 TABLET ORAL
COMMUNITY
Start: 2021-02-22 | End: 2022-08-08

## 2021-03-22 RX ADMIN — GABAPENTIN 300 MG: 300 CAPSULE ORAL at 12:03

## 2021-03-22 RX ADMIN — PANTOPRAZOLE SODIUM 40 MG: 40 TABLET, DELAYED RELEASE ORAL at 12:03

## 2021-03-22 RX ADMIN — ATORVASTATIN CALCIUM 40 MG: 20 TABLET, FILM COATED ORAL at 12:03

## 2021-03-22 RX ADMIN — LOSARTAN POTASSIUM 50 MG: 50 TABLET, FILM COATED ORAL at 12:03

## 2021-03-22 RX ADMIN — GABAPENTIN 300 MG: 300 CAPSULE ORAL at 05:03

## 2021-03-22 RX ADMIN — METOPROLOL SUCCINATE 50 MG: 50 TABLET, EXTENDED RELEASE ORAL at 12:03

## 2021-03-22 RX ADMIN — BENZTROPINE MESYLATE 2 MG: 0.5 TABLET ORAL at 12:03

## 2021-03-22 RX ADMIN — FLUTICASONE PROPIONATE 100 MCG: 50 SPRAY, METERED NASAL at 12:03

## 2021-03-22 RX ADMIN — FLUTICASONE FUROATE 1 PUFF: 100 POWDER RESPIRATORY (INHALATION) at 09:03

## 2021-03-22 RX ADMIN — POTASSIUM CHLORIDE 40 MEQ: 750 CAPSULE, EXTENDED RELEASE ORAL at 03:03

## 2021-03-22 RX ADMIN — MONTELUKAST 10 MG: 10 TABLET, FILM COATED ORAL at 12:03

## 2021-03-22 RX ADMIN — APIXABAN 10 MG: 5 TABLET, FILM COATED ORAL at 12:03

## 2021-03-22 RX ADMIN — CETIRIZINE HYDROCHLORIDE 10 MG: 5 TABLET ORAL at 12:03

## 2021-03-22 RX ADMIN — OXYBUTYNIN CHLORIDE 5 MG: 5 TABLET, EXTENDED RELEASE ORAL at 12:03

## 2021-03-22 RX ADMIN — INDAPAMIDE 2.5 MG: 2.5 TABLET, FILM COATED ORAL at 12:03

## 2021-03-22 RX ADMIN — HALOPERIDOL 5 MG: 5 TABLET ORAL at 12:03

## 2021-06-10 ENCOUNTER — HISTORICAL (OUTPATIENT)
Dept: RADIOLOGY | Facility: HOSPITAL | Age: 71
End: 2021-06-10

## 2021-07-01 ENCOUNTER — PATIENT MESSAGE (OUTPATIENT)
Dept: ADMINISTRATIVE | Facility: OTHER | Age: 71
End: 2021-07-01

## 2021-07-01 ENCOUNTER — TELEPHONE (OUTPATIENT)
Dept: HEPATOLOGY | Facility: CLINIC | Age: 71
End: 2021-07-01

## 2021-07-08 ENCOUNTER — LAB VISIT (OUTPATIENT)
Dept: LAB | Facility: HOSPITAL | Age: 71
End: 2021-07-08
Payer: MEDICARE

## 2021-07-08 ENCOUNTER — OFFICE VISIT (OUTPATIENT)
Dept: INTERNAL MEDICINE | Facility: CLINIC | Age: 71
End: 2021-07-08
Payer: MEDICARE

## 2021-07-08 VITALS
HEIGHT: 68 IN | SYSTOLIC BLOOD PRESSURE: 132 MMHG | HEART RATE: 84 BPM | OXYGEN SATURATION: 98 % | DIASTOLIC BLOOD PRESSURE: 78 MMHG | BODY MASS INDEX: 25.46 KG/M2 | WEIGHT: 168 LBS

## 2021-07-08 DIAGNOSIS — I26.99 ACUTE PULMONARY EMBOLISM WITHOUT ACUTE COR PULMONALE, UNSPECIFIED PULMONARY EMBOLISM TYPE: Primary | ICD-10-CM

## 2021-07-08 DIAGNOSIS — N81.10 VAGINAL PROLAPSE: ICD-10-CM

## 2021-07-08 DIAGNOSIS — R53.1 WEAKNESS: ICD-10-CM

## 2021-07-08 DIAGNOSIS — E78.5 HYPERLIPIDEMIA, UNSPECIFIED HYPERLIPIDEMIA TYPE: ICD-10-CM

## 2021-07-08 LAB
CHOLEST SERPL-MCNC: 171 MG/DL (ref 120–199)
CHOLEST/HDLC SERPL: 2.8 {RATIO} (ref 2–5)
HDLC SERPL-MCNC: 62 MG/DL (ref 40–75)
HDLC SERPL: 36.3 % (ref 20–50)
LDLC SERPL CALC-MCNC: 97.8 MG/DL (ref 63–159)
NONHDLC SERPL-MCNC: 109 MG/DL
TRIGL SERPL-MCNC: 56 MG/DL (ref 30–150)

## 2021-07-08 PROCEDURE — 99999 PR PBB SHADOW E&M-EST. PATIENT-LVL III: CPT | Mod: PBBFAC,GC,, | Performed by: STUDENT IN AN ORGANIZED HEALTH CARE EDUCATION/TRAINING PROGRAM

## 2021-07-08 PROCEDURE — 99203 OFFICE O/P NEW LOW 30 MIN: CPT | Mod: GC,S$GLB,, | Performed by: STUDENT IN AN ORGANIZED HEALTH CARE EDUCATION/TRAINING PROGRAM

## 2021-07-08 PROCEDURE — 80061 LIPID PANEL: CPT | Performed by: STUDENT IN AN ORGANIZED HEALTH CARE EDUCATION/TRAINING PROGRAM

## 2021-07-08 PROCEDURE — 1159F MED LIST DOCD IN RCRD: CPT | Mod: GC,S$GLB,, | Performed by: STUDENT IN AN ORGANIZED HEALTH CARE EDUCATION/TRAINING PROGRAM

## 2021-07-08 PROCEDURE — 36415 COLL VENOUS BLD VENIPUNCTURE: CPT | Performed by: STUDENT IN AN ORGANIZED HEALTH CARE EDUCATION/TRAINING PROGRAM

## 2021-07-08 PROCEDURE — 99203 PR OFFICE/OUTPT VISIT, NEW, LEVL III, 30-44 MIN: ICD-10-PCS | Mod: GC,S$GLB,, | Performed by: STUDENT IN AN ORGANIZED HEALTH CARE EDUCATION/TRAINING PROGRAM

## 2021-07-08 PROCEDURE — 99999 PR PBB SHADOW E&M-EST. PATIENT-LVL III: ICD-10-PCS | Mod: PBBFAC,GC,, | Performed by: STUDENT IN AN ORGANIZED HEALTH CARE EDUCATION/TRAINING PROGRAM

## 2021-07-08 PROCEDURE — 1159F PR MEDICATION LIST DOCUMENTED IN MEDICAL RECORD: ICD-10-PCS | Mod: GC,S$GLB,, | Performed by: STUDENT IN AN ORGANIZED HEALTH CARE EDUCATION/TRAINING PROGRAM

## 2022-04-11 ENCOUNTER — HISTORICAL (OUTPATIENT)
Dept: ADMINISTRATIVE | Facility: HOSPITAL | Age: 72
End: 2022-04-11
Payer: MEDICARE

## 2022-04-25 VITALS — HEIGHT: 63 IN | BODY MASS INDEX: 26.64 KG/M2 | WEIGHT: 150.38 LBS

## 2022-05-04 NOTE — HISTORICAL [facility] CERNER
This is a historical note converted from [employer]. Formatting and pictures may have been removed.  Please reference Cerprisc[employer]al for original formatting and attached multimedia. DATE OF SURGERY:?10/12/20  ?  SURGEON:?Lorie DEL VALLE, Kira OREILLY (Surgeon Primary)  ?  ASSIST: Zarina Francois PA-C  ?  PREOPERATIVE DIAGNOSIS:  1. Left breast cancer  2. Venous insufficiency  ?   POSTOPERATIVE DIAGNOSIS:  1. Left breast cancer  2. Venous insufficiency  ?   PROCEDURE:?Right subclavian vein?Mediport placement under fluoroscopic-guidance  ?  ANESTHESIA:?General Intravenous plus 10 ml of 1% Lidocaine with Epinephrine and 20 ml of 0.5% Bupivacaine  Dolly DEL VALLE, Angel Luis TAM (Supervisor)  Nadine Thomas CRNA (Provider)  ?  ESTIMATED BLOOD LOSS:?2 mL  ?  FINDING(S): Normal vascular anatomy  ?   SPECIMEN(S):?None  ?   DRAINS: None  ?  COMPLICATION(S): None  ?  PROCEDURE INDICATION:  She is in need of adjuvant chemotherapy and has venous insufficiency. I explained to her that this would be an outpatient procedure with local anesthesia and sedation. The port would be placed under her skin with a catheter which runs into the major vein in her neck or chest. I explained to her my initial access site would be the subclavian vein beneath the clavicle on the left side. If not successful, then the second option would be to do another incision in the neck to access the internal jugular vein. The risks and complications of pain, bleeding, infection, blood clot, scarring, the inability to access the port, clogging of the port, and pneumo/hemothorax with possible chest tube or additional surgery were all explained in detail. Informed consent was obtained.  ?  PROCEDURE DESCRIPTION:  The patient was then brought to the operating room and placed supine on the operative table.?Sedation anesthesia was initiated. The skin of the?Left and Right neck, chest, and shoulder were prepped and draped in standard sterile surgical fashion. A time-out was completed  verifying correct patient, procedure, site, positioning and equipment prior to beginning the procedure.?  ?  An?incision was planned of the Right infraclavicular area about 2 fingerbreadths below the clavicle. 10 mL of 1% Lidocaine with Epinephrine plus 0.5% of Bupivacaine?was used to anesthetize the incision site.?The incision was made with a 15-blade. The subcutaneous tissue was dissected using electrocautery. Hemostasis was checked and maintained. Using Seldinger technique the left subclavian vein was assessed. A guide-wire was then introduced. Using the C-Arm the positioning of the wire was checked and determine to be in the appropriate location.?A sheath was then inserted over the guide-wire and the inner cannula and guide-wire were both removed. The catheter which had already been flushed and checked?was then inserted into the?subclavian?vein. The C-Arm was then?used to?confirm and adjust the catheter?so that it was positioned in the superior vena cava. The length of the catheter was measure at?17 cm from the?entry point.  ?  A subcutaneous pocket was then created for the?Mediport. The catheter was cut and attached to the Mediport.?The port was flushed and checked with Heparinized solution. The port was placed into the pocket and?sutured in place with Prolene sutures. The?Mediport was?checked and flushed with heparinized solution. The subcutaneous tissues were closed with 3-0 Vicryl and the skin closed with running 4-0 Monocryl subcuticular stitches. Exofin was applied followed by 4x4 gauze and Tegaderm.  ?  ?  All instruments and sponge counts were correct at the end of the procedure. The patient was awaken from anesthesia and taken to the post-anesthesia care unit in stable condition. Post-Op CXR revealed Mediport in good location without signs of pneumothorax.  ?

## 2022-05-04 NOTE — HISTORICAL [facility] CERNER
This is a historical note converted from [employer]. Formatting and pictures may have been removed.  Please reference Cerpriscilla for original formatting and attached multimedia. Admission H&P Update - Breast Surgery Note?10/12/20  ?  Nancy Parekh?is a pleasant?69 Years?old?Female?who presents with AJCC 8th ed clinical anatomic?stage?IIA?/ prognostic stage?IIB?(cT2?cN0?M0) multifocal?Left?invasive ductal carcinoma?grade 3?ER 0%, TX 0%, HER2?1+ - negative on IHC., possible Stage IV with right anterior rib lesion.  ?  The H&P was reviewed, the patient was examined and there are no changes to the patients condition.  ?   A/P:  1. Venous Insufficiency - Mediport Placement today  2. Left breast cancer  ?   All of her questions were answered.  ?   Kira Melo MD

## 2022-05-04 NOTE — HISTORICAL [facility] CERNER
This is a historical note converted from [employer]. Formatting and pictures may have been removed.  Please reference Cerpriscilla for original formatting and attached multimedia. Admission H&P Update - Breast Surgery Note  ?  Nancy Parekh?initially presented on 10/7/2020?with AJCC 8th ed clinical anatomic?stage?IIA?/ prognostic stage?IIB?(cT2?cN0?M0) multifocal?Left?invasive ductal carcinoma?grade 3?ER 0%, WA 0%, HER2?1+ - negative on IHC., possible Stage IV with right anterior rib lesion. She has completed neoadjuvant weekly Taxon x 12 cycles (10/19/2020 - 1/7/2021).  ?  The H&P was reviewed, the patient was examined and there are no changes to the patients condition.  ?  ?  ?   A/P:  1. Left Simple Mastectomy with SLNB and possible ALND  ?  All of her questions were answered.  ?  Kira Melo MD

## 2022-05-04 NOTE — HISTORICAL [facility] CERNER
This is a historical note converted from [employer]. Formatting and pictures may have been removed.  Please reference Cerpriscilla for original formatting and attached multimedia. DATE OF SERVICE:?02/22/21  ?  SURGEON: Dr. Kira Melo  ?   ASSIST: Zarina Francois PA-C  ?   PREOPERATIVE DIAGNOSIS:?  1.Left Breast?Invasive Ductal Carcinoma?630 oclock 4 cm FN  2. Left Breast Invasive Ductal Carcinoma?6 oclock 3-4 cm FN  ?  POSTOPERATIVE DIAGNOSIS:?  1. Left Breast?Invasive Ductal Carcinoma?630 oclock 4 cm FN  2. Left Breast Invasive Ductal Carcinoma?6 oclock 3-4 cm FN  ?  PROCEDURE:  1.?Leftsubareolar injection of isosulfan blue dye  2.?Left?axillary sentinel lymph node biopsy  3.?Left?simple mastectomy  ?   ANESTHESIA:?General Endo  Dolly DEL VALLE, Telly PALOMARES (Supervisor)  Ayla Queen CRNA (Provider)  Nadine Thomas CRNA (Provider)  ?  ESTIMATED BLOOD LOSS:?20 mL  ?  FINDINGS:  Left breast tissue  Left axillary lymph nodes normal in appearance  ?   SPECIMEN:?  1. Left Simple Mastectomy -STITCH-SHORT SUPERIOR, LONG LATERAL, DOUBLE AXILLARY CONTENT  2.Left?axillary sentinel lymph node #1,?TC-99 count:?1610 and blue dye identified  3. Left?axillary sentinel lymph node #2,?TC-99 count:?4746 and blue dye identified  ?  ?  DRAIN(S):?15 - [place]ian Torey Lindsay Drain in Left Mastectomy site  ?  COMPLICATION(S): None  ?   PROCEDURE INDICATION:  aNncy Parekh?initially presented on 10/7/2020?with AJCC 8th ed clinical anatomic?stage?IIA?/ prognostic stage?IIB?(cT2?cN0?M0) multifocal?Left?invasive ductal carcinoma?grade 3?ER 0%, MS 0%, HER2?1+ - negative on IHC., possible Stage IV with right anterior rib lesion. She has completed neoadjucant weekly Taxon x 12 cycles (10/19/2020 - 1/7/2021).  ?   The patients clinical exam was reviewed with her. She has responded well to chemotherapy based on imaging assessment. The mass has decreased in size. ?She has completed chemotherapy on 1/7/2021. ?  ?  I reviewed her surgical options which  include mastectomy versus lumpectomy. The general operative procedure of mastectomy, the skin sparing nature and attempts made to save underlying muscle with modified mastectomy were discussed. ?The options of primary reconstruction following mastectomy include either implant reconstruction or tissue transfer type of reconstruction. ?The pros and cons of both of these reconstructive methods were addressed. ?Both of these are performed in stages and second operation is usually required before the final completion of the reconstructive process. ?I also explained to the patient that the reconstructive options are generally by law required to be covered by insurance and would be considered part of a cancer operation and not a cosmetic operation. ?Sometimes also a reduction or mastopexy is performed on the opposite side for better match, and this operation by itself is also considered part of the cancer treatment. ?  ?  Following explanation of the mastectomy option, I then explained to her the procedure of lumpectomy. ?The rationale for lumpectomy, the need to obtain clear margins was specifically addressed. ?The absolute necessity of adding radiation following lumpectomy with good margins was explained.?  ?  Following this, I also explained to her the procedure of sentinel lymph node mapping and its rationale. ?We have a 98% success rate identifying the sentinel node. ?The need to use radioactive dye and blue dye to ensure proper identification of the node was explained. Also the small but real risk of anaphylactic shock with blue dye was discussed.The 5 to 10% false/negative rate of sentinel lymph node mapping and approximately 10% delayed positive rate of sentinel lymph node mapping was discussed. ?The process of Touch-Prep/Frozen Section?and its significance was also explained. ?The need to perform completion dissection should the sentinel lymph node be positive was also explained to the patient.??  ?   Given the  size of her breast and the original area of concern, I expressed that a simple mastectomy would likely be the best option.  ?  We also discussed the role of right 4th rib lesion biopsy. I believe at this time, the lesion could have been present prior to cancer diagnosis given the history of traumatic fall leading to shoulder fracture per her report. However, the lesion can not be excluded as a metastasis but appears to have become more sclerotic after neoadjuvant chemotherapy. There is data to shows with oligometastasis especially to bone there is good overall survival with aggressive treatment of the primary tumor. I do not believe we will gain much benefit from sampling the right 4th rib given she has already completed neoadjuvant chemotherapy. If Med Onc would still like it sample,?then I would consider involving Dr. Ramos, Interventional Radiology with background in oncology for this biopsy  ?  She elected to proceed with left simple mastectomy with sentinel lymph node biopsy, possible ALND.  ?  PROCEDURE DESCRIPTION:  Prior to the operating room the patient was injected with Technetium-99 (TC-99) sulfur colloid by the nuclear medicine. The patient was then brought to the operating room and placed supine on the operative table. General anesthesia was induced.?3 cc?s of?isosulfan blue dye was injected in the subareolar space of?the Left breast?and gently messages. The skin of ?the Left?breast?was?prepped and draped in standard sterile surgical fashion along with?the Left?axilla and upper arm. A time-out was completed verifying correct patient, procedure, site, positioning and equipment prior to beginning the procedure.?  ?  A large?elliptical skin incision was made that encompassed the nipple-areola complex and the previous biopsy site on the?Left. Flaps were raised in the avascular plane between subcutaneous tissue and breast tissue from the clavicle superiorly, the sternum medially, the anterior rectus sheath  inferiorly, and the lateral border of the pectoralis major muscle laterally. Hemostasis was achieved in the flaps. Next, the breast tissue and underlying pectoralis fascia were excised from the pectoralis major muscle, progressing from medial to lateral. At the lateral border of the pectoralis major muscle, the breast tissue was sung laterally and a lateral pedicle identified where breast tissue gave way to fat of axilla. The lateral pedicle was incised, removed and the specimen was marked. Superiorly was a short suture and laterally was a long suture.  ?  Left?[place] Lymph Node Biopsy was then performed  The lateral?border of the pectoralis of the?Left?axilla was excised and the axilla was entered. Using a hand-held gamma probe ([employer])?the axilla was assessed for a sentinel lymph node.  Left?axillary sentinel lymph node #1,?TC-99 count:?1610 and blue dye identified  Left?axillary sentinel lymph node #2,?TC-99 count:?4746 and blue dye identified  ?  ?  The substances used?for sentinel lymph node biopsy in this patient: ?were Technetium-99 (TC-99) and Isosulfan Blue Dye (3ml)  Number of lymph nodes removed with?Isosulfan (or Methylene) blue dye and TC-99:?2  Number of lymph nodes removed with only?TC-99 signal:?0  Number of lymph nodes removed with only?Isosulfan (or Methylene) blue dye:?0  Number of lymph nodes removed with only palpable: ?0  ?  The sentinel lymph nodes were sent to Pathologist for intra-operative Frozen Section.  ?  While in the operating room the Pathologist reported the sentinel lymph node(s) as Negative. No further dissection was undertaken.?  ?  The?cavity was?irrigated and hemostasis was obtained.?Via a remote separate incision, a 15-[place] round Torey-Lindsay (MACHO) was placed and secured with a 3-0 Nylon bonilla sandal suture.  ?  The subdermal?layer was?closed?with 3-0?Monocryl and 4-0 subcuticular running skin closures.?Prineo Wound Closure System?was applied?followed by sterile  dressings.  ?  The?patient was awakened from anesthesia and taken to the postanesthesia care unit in stable condition.?  ?  The skilled assistance of the Physician Assistant, Zarina Francois PA-C, was necessary for the successful completion of this case. She was essential for proper positioning of the patient, manipulation of instruments, proper exposure, manipulation of tissue, and wound closure.?

## 2022-05-09 PROBLEM — C50.812 MALIGNANT NEOPLASM OF OVERLAPPING SITES OF LEFT BREAST IN FEMALE, ESTROGEN RECEPTOR NEGATIVE: Status: ACTIVE | Noted: 2022-05-09

## 2022-05-09 PROBLEM — Z17.1 MALIGNANT NEOPLASM OF OVERLAPPING SITES OF LEFT BREAST IN FEMALE, ESTROGEN RECEPTOR NEGATIVE: Status: ACTIVE | Noted: 2022-05-09

## 2022-05-10 ENCOUNTER — OFFICE VISIT (OUTPATIENT)
Dept: HEMATOLOGY/ONCOLOGY | Facility: CLINIC | Age: 72
End: 2022-05-10
Payer: MEDICARE

## 2022-05-10 VITALS
HEART RATE: 77 BPM | WEIGHT: 171.19 LBS | SYSTOLIC BLOOD PRESSURE: 130 MMHG | OXYGEN SATURATION: 100 % | BODY MASS INDEX: 30.33 KG/M2 | DIASTOLIC BLOOD PRESSURE: 86 MMHG | TEMPERATURE: 99 F | HEIGHT: 63 IN | RESPIRATION RATE: 16 BRPM

## 2022-05-10 DIAGNOSIS — C50.812 MALIGNANT NEOPLASM OF OVERLAPPING SITES OF LEFT BREAST IN FEMALE, ESTROGEN RECEPTOR NEGATIVE: Primary | ICD-10-CM

## 2022-05-10 DIAGNOSIS — Z17.1 MALIGNANT NEOPLASM OF OVERLAPPING SITES OF LEFT BREAST IN FEMALE, ESTROGEN RECEPTOR NEGATIVE: Primary | ICD-10-CM

## 2022-05-10 PROCEDURE — 3008F BODY MASS INDEX DOCD: CPT | Mod: CPTII,,, | Performed by: NURSE PRACTITIONER

## 2022-05-10 PROCEDURE — 3079F PR MOST RECENT DIASTOLIC BLOOD PRESSURE 80-89 MM HG: ICD-10-PCS | Mod: CPTII,,, | Performed by: NURSE PRACTITIONER

## 2022-05-10 PROCEDURE — 1160F PR REVIEW ALL MEDS BY PRESCRIBER/CLIN PHARMACIST DOCUMENTED: ICD-10-PCS | Mod: CPTII,,, | Performed by: NURSE PRACTITIONER

## 2022-05-10 PROCEDURE — 1159F PR MEDICATION LIST DOCUMENTED IN MEDICAL RECORD: ICD-10-PCS | Mod: CPTII,,, | Performed by: NURSE PRACTITIONER

## 2022-05-10 PROCEDURE — 3079F DIAST BP 80-89 MM HG: CPT | Mod: CPTII,,, | Performed by: NURSE PRACTITIONER

## 2022-05-10 PROCEDURE — 3288F FALL RISK ASSESSMENT DOCD: CPT | Mod: CPTII,,, | Performed by: NURSE PRACTITIONER

## 2022-05-10 PROCEDURE — 1159F MED LIST DOCD IN RCRD: CPT | Mod: CPTII,,, | Performed by: NURSE PRACTITIONER

## 2022-05-10 PROCEDURE — 1101F PR PT FALLS ASSESS DOC 0-1 FALLS W/OUT INJ PAST YR: ICD-10-PCS | Mod: CPTII,,, | Performed by: NURSE PRACTITIONER

## 2022-05-10 PROCEDURE — 4010F ACE/ARB THERAPY RXD/TAKEN: CPT | Mod: CPTII,,, | Performed by: NURSE PRACTITIONER

## 2022-05-10 PROCEDURE — 3288F PR FALLS RISK ASSESSMENT DOCUMENTED: ICD-10-PCS | Mod: CPTII,,, | Performed by: NURSE PRACTITIONER

## 2022-05-10 PROCEDURE — 4010F PR ACE/ARB THEARPY RXD/TAKEN: ICD-10-PCS | Mod: CPTII,,, | Performed by: NURSE PRACTITIONER

## 2022-05-10 PROCEDURE — 99213 PR OFFICE/OUTPT VISIT, EST, LEVL III, 20-29 MIN: ICD-10-PCS | Mod: ,,, | Performed by: NURSE PRACTITIONER

## 2022-05-10 PROCEDURE — 3008F PR BODY MASS INDEX (BMI) DOCUMENTED: ICD-10-PCS | Mod: CPTII,,, | Performed by: NURSE PRACTITIONER

## 2022-05-10 PROCEDURE — 1101F PT FALLS ASSESS-DOCD LE1/YR: CPT | Mod: CPTII,,, | Performed by: NURSE PRACTITIONER

## 2022-05-10 PROCEDURE — 3075F PR MOST RECENT SYSTOLIC BLOOD PRESS GE 130-139MM HG: ICD-10-PCS | Mod: CPTII,,, | Performed by: NURSE PRACTITIONER

## 2022-05-10 PROCEDURE — 99213 OFFICE O/P EST LOW 20 MIN: CPT | Mod: ,,, | Performed by: NURSE PRACTITIONER

## 2022-05-10 PROCEDURE — 1160F RVW MEDS BY RX/DR IN RCRD: CPT | Mod: CPTII,,, | Performed by: NURSE PRACTITIONER

## 2022-05-10 PROCEDURE — 3075F SYST BP GE 130 - 139MM HG: CPT | Mod: CPTII,,, | Performed by: NURSE PRACTITIONER

## 2022-05-10 RX ORDER — FLUTICASONE PROPIONATE 50 MCG
SPRAY, SUSPENSION (ML) NASAL
COMMUNITY
End: 2023-01-10 | Stop reason: SDUPTHER

## 2022-05-10 RX ORDER — MIRABEGRON 50 MG/1
50 TABLET, FILM COATED, EXTENDED RELEASE ORAL DAILY
Status: ON HOLD | COMMUNITY
End: 2023-01-25 | Stop reason: HOSPADM

## 2022-05-10 RX ORDER — NAPROXEN 500 MG/1
TABLET ORAL
COMMUNITY
End: 2023-01-10

## 2022-05-10 RX ORDER — PANTOPRAZOLE SODIUM 40 MG/1
TABLET, DELAYED RELEASE ORAL
COMMUNITY
End: 2022-08-08

## 2022-05-10 RX ORDER — INDAPAMIDE 2.5 MG/1
1 TABLET ORAL DAILY
Status: ON HOLD | COMMUNITY
End: 2023-01-25 | Stop reason: HOSPADM

## 2022-05-10 RX ORDER — ATORVASTATIN CALCIUM 40 MG/1
40 TABLET, FILM COATED ORAL NIGHTLY
COMMUNITY

## 2022-05-10 RX ORDER — LANOLIN ALCOHOL/MO/W.PET/CERES
CREAM (GRAM) TOPICAL
COMMUNITY
End: 2023-01-10

## 2022-05-10 RX ORDER — LOSARTAN POTASSIUM 50 MG/1
25 TABLET ORAL DAILY
COMMUNITY

## 2022-05-10 RX ORDER — TERAZOSIN 2 MG/1
2 CAPSULE ORAL NIGHTLY
Status: ON HOLD | COMMUNITY
End: 2023-01-25 | Stop reason: HOSPADM

## 2022-05-10 RX ORDER — BENZONATATE 100 MG/1
CAPSULE ORAL
COMMUNITY
Start: 2022-04-06 | End: 2022-08-08

## 2022-05-10 RX ORDER — LORATADINE 10 MG/1
TABLET ORAL
COMMUNITY
End: 2023-01-10

## 2022-05-10 RX ORDER — METOPROLOL SUCCINATE 50 MG/1
50 TABLET, EXTENDED RELEASE ORAL DAILY
COMMUNITY
End: 2023-01-10 | Stop reason: DRUGHIGH

## 2022-05-10 RX ORDER — POTASSIUM CHLORIDE 1500 MG/1
20 TABLET, EXTENDED RELEASE ORAL 2 TIMES DAILY
Status: ON HOLD | COMMUNITY
End: 2023-01-25 | Stop reason: HOSPADM

## 2022-06-13 ENCOUNTER — HOSPITAL ENCOUNTER (OUTPATIENT)
Dept: RADIOLOGY | Facility: HOSPITAL | Age: 72
Discharge: HOME | End: 2022-06-13
Attending: PHYSICIAN ASSISTANT
Payer: MEDICARE

## 2022-06-13 DIAGNOSIS — Z12.31 ENCOUNTER FOR SCREENING MAMMOGRAM FOR MALIGNANT NEOPLASM OF BREAST: ICD-10-CM

## 2022-06-13 PROCEDURE — 77067 SCR MAMMO BI INCL CAD: CPT | Mod: 26,52,, | Performed by: STUDENT IN AN ORGANIZED HEALTH CARE EDUCATION/TRAINING PROGRAM

## 2022-06-13 PROCEDURE — 77063 MAMMO DIGITAL SCREENING RIGHT WITH TOMO: ICD-10-PCS | Mod: 26,52,, | Performed by: STUDENT IN AN ORGANIZED HEALTH CARE EDUCATION/TRAINING PROGRAM

## 2022-06-13 PROCEDURE — 77067 MAMMO DIGITAL SCREENING RIGHT WITH TOMO: ICD-10-PCS | Mod: 26,52,, | Performed by: STUDENT IN AN ORGANIZED HEALTH CARE EDUCATION/TRAINING PROGRAM

## 2022-06-13 PROCEDURE — 77067 SCR MAMMO BI INCL CAD: CPT | Mod: TC

## 2022-06-13 PROCEDURE — 77063 BREAST TOMOSYNTHESIS BI: CPT | Mod: 26,52,, | Performed by: STUDENT IN AN ORGANIZED HEALTH CARE EDUCATION/TRAINING PROGRAM

## 2022-06-28 ENCOUNTER — OFFICE VISIT (OUTPATIENT)
Dept: SURGERY | Facility: CLINIC | Age: 72
End: 2022-06-28
Payer: MEDICARE

## 2022-06-28 VITALS
OXYGEN SATURATION: 100 % | WEIGHT: 164.38 LBS | HEIGHT: 63 IN | TEMPERATURE: 97 F | SYSTOLIC BLOOD PRESSURE: 119 MMHG | DIASTOLIC BLOOD PRESSURE: 68 MMHG | HEART RATE: 77 BPM | RESPIRATION RATE: 18 BRPM | BODY MASS INDEX: 29.12 KG/M2

## 2022-06-28 DIAGNOSIS — E27.9 LESION OF ADRENAL GLAND: ICD-10-CM

## 2022-06-28 DIAGNOSIS — Z08 ENCOUNTER FOR FOLLOW-UP SURVEILLANCE OF BREAST CANCER: Primary | ICD-10-CM

## 2022-06-28 DIAGNOSIS — Z85.3 ENCOUNTER FOR FOLLOW-UP SURVEILLANCE OF BREAST CANCER: Primary | ICD-10-CM

## 2022-06-28 DIAGNOSIS — Z90.12 HISTORY OF LEFT MASTECTOMY: ICD-10-CM

## 2022-06-28 PROCEDURE — 1160F PR REVIEW ALL MEDS BY PRESCRIBER/CLIN PHARMACIST DOCUMENTED: ICD-10-PCS | Mod: CPTII,S$GLB,, | Performed by: PHYSICIAN ASSISTANT

## 2022-06-28 PROCEDURE — 3008F BODY MASS INDEX DOCD: CPT | Mod: CPTII,S$GLB,, | Performed by: PHYSICIAN ASSISTANT

## 2022-06-28 PROCEDURE — 3078F DIAST BP <80 MM HG: CPT | Mod: CPTII,S$GLB,, | Performed by: PHYSICIAN ASSISTANT

## 2022-06-28 PROCEDURE — 1126F AMNT PAIN NOTED NONE PRSNT: CPT | Mod: CPTII,S$GLB,, | Performed by: PHYSICIAN ASSISTANT

## 2022-06-28 PROCEDURE — 99214 PR OFFICE/OUTPT VISIT, EST, LEVL IV, 30-39 MIN: ICD-10-PCS | Mod: S$GLB,,, | Performed by: PHYSICIAN ASSISTANT

## 2022-06-28 PROCEDURE — 1159F MED LIST DOCD IN RCRD: CPT | Mod: CPTII,S$GLB,, | Performed by: PHYSICIAN ASSISTANT

## 2022-06-28 PROCEDURE — 3078F PR MOST RECENT DIASTOLIC BLOOD PRESSURE < 80 MM HG: ICD-10-PCS | Mod: CPTII,S$GLB,, | Performed by: PHYSICIAN ASSISTANT

## 2022-06-28 PROCEDURE — 3074F SYST BP LT 130 MM HG: CPT | Mod: CPTII,S$GLB,, | Performed by: PHYSICIAN ASSISTANT

## 2022-06-28 PROCEDURE — 1159F PR MEDICATION LIST DOCUMENTED IN MEDICAL RECORD: ICD-10-PCS | Mod: CPTII,S$GLB,, | Performed by: PHYSICIAN ASSISTANT

## 2022-06-28 PROCEDURE — 99214 OFFICE O/P EST MOD 30 MIN: CPT | Mod: S$GLB,,, | Performed by: PHYSICIAN ASSISTANT

## 2022-06-28 PROCEDURE — 99999 PR PBB SHADOW E&M-EST. PATIENT-LVL V: CPT | Mod: PBBFAC,,,

## 2022-06-28 PROCEDURE — 4010F PR ACE/ARB THEARPY RXD/TAKEN: ICD-10-PCS | Mod: CPTII,S$GLB,, | Performed by: PHYSICIAN ASSISTANT

## 2022-06-28 PROCEDURE — 99215 OFFICE O/P EST HI 40 MIN: CPT | Mod: PBBFAC | Performed by: PHYSICIAN ASSISTANT

## 2022-06-28 PROCEDURE — 4010F ACE/ARB THERAPY RXD/TAKEN: CPT | Mod: CPTII,S$GLB,, | Performed by: PHYSICIAN ASSISTANT

## 2022-06-28 PROCEDURE — 1160F RVW MEDS BY RX/DR IN RCRD: CPT | Mod: CPTII,S$GLB,, | Performed by: PHYSICIAN ASSISTANT

## 2022-06-28 PROCEDURE — 99999 PR PBB SHADOW E&M-EST. PATIENT-LVL V: ICD-10-PCS | Mod: PBBFAC,,,

## 2022-06-28 PROCEDURE — 1126F PR PAIN SEVERITY QUANTIFIED, NO PAIN PRESENT: ICD-10-PCS | Mod: CPTII,S$GLB,, | Performed by: PHYSICIAN ASSISTANT

## 2022-06-28 PROCEDURE — 3008F PR BODY MASS INDEX (BMI) DOCUMENTED: ICD-10-PCS | Mod: CPTII,S$GLB,, | Performed by: PHYSICIAN ASSISTANT

## 2022-06-28 PROCEDURE — 3074F PR MOST RECENT SYSTOLIC BLOOD PRESSURE < 130 MM HG: ICD-10-PCS | Mod: CPTII,S$GLB,, | Performed by: PHYSICIAN ASSISTANT

## 2022-06-28 RX ORDER — OMEGA-3-ACID ETHYL ESTERS 1 G/1
CAPSULE, LIQUID FILLED ORAL
COMMUNITY
Start: 2022-05-27 | End: 2023-01-10

## 2022-06-28 RX ORDER — EZETIMIBE 10 MG/1
10 TABLET ORAL NIGHTLY
Status: ON HOLD | COMMUNITY
End: 2023-01-25 | Stop reason: HOSPADM

## 2022-06-28 RX ORDER — MULTIVIT,IRON,MINERALS/LUTEIN
TABLET ORAL
Status: ON HOLD | COMMUNITY
End: 2023-01-25 | Stop reason: HOSPADM

## 2022-06-28 RX ORDER — MULTIVITAMIN
TABLET ORAL
COMMUNITY
End: 2023-01-10

## 2022-06-28 RX ORDER — PROPYLENE GLYCOL 0.06 MG/ML
EMULSION OPHTHALMIC
COMMUNITY
End: 2022-08-08

## 2022-06-28 NOTE — PROGRESS NOTES
[facility] Breast Surg  Breast Surgical Oncology  Follow-Up Patient Office Visit       Referring Provider: Dr. Claude H. Meeks   PCP: Alejandro Forbes MD   Care Team:   Medical Oncologist: Barrett Herr MD     Chief Complaint:   Chief Complaint   Patient presents with    Follow-up     Followup for imaging results, occasional vaginal bleeding using two sanitary pads per day lasting several days, light to moderate bleeding, no cramping/pain        Subjective:   Treatment History:  1. Right subclavian Mediport placement 10/12/2020  2. Neoadjuvant chemotherapy weekly Taxol x 12 cycles 10/19/2020 - 1/7/2021  3. Left Simple Mastectomy with SLNB 2/22/2021  4. She was referred to [facility], RT not recommended due to multiple comorbidities.  5. Xeloda offered for adjuvant treatment, declined.  6. Genetic counseling referral declined.    Interval History:  6/28/2022 - Nancy Parekh is doing well and is here for 6 month follow up. She had a right screening mammogram 6/18/2022 which was benign. She has no breast concerns at this time. However, she does report occcasional vaginal bleeding requiring a pad. She also has moved to a Nursing Home in [place].    I received and reviewed images from [facility] performed in 3/2021. CT Chest noting two well-circumscribed hepatic hypodensities possibly representing cysts and an additional questionable 1.5 cm left adrenal nodule in the partially visualized upper abdomen. There were no comparison images available for this scan and correlation was recommended. If no correlate seen, CT Abdomen was recommended. She denies any abdominal symptoms including changes in bowel movements, abdominal pain, or urinary symptoms.    HPI:  Nancy Parekh initially presented on 10/7/2020 with AJCC 8th ed clinical anatomic stage IIA / prognostic stage IIB (cT2 cN0 M0) multifocal Left invasive ductal carcinoma grade 3 ER 0%, AK 0%, HER2 1+ - negative  on IHC., possible Stage IV with right anterior rib lesion. She has completed neoadjuvant weekly Taxon x 12 cycles (10/19/2020 - 2021). She is SP left simple mastectomy with SLNB on 2021. Final pathology revealed AJCC 8th ed pathological stage (pT1a pN0 M1?) left breast residual focus of invasive ductal carcinoma, grade 3 with extensive chemotherapeutic effect, measuring 3 mm at 6 o'clock, and closed margin 1.0 cm. Biopsy site changes at 6:30 o'clock with no residual carcinoma identified. Two sentinel lymph nodes negative for metastatic carcinoma.    Imagin. 2020 BL SC MG at [facility] - revealing an area of calcifications in the central right breast and a developing asymmetry in the left breast at 6:00 o'clock. BIRADS-0: additional imaging recommended    . 2020 BL DG MG and Left US at [facility] - revealing benign appearing scattered, punctate, calcifications in the central right breast, a 1.1 cm oval mas with indistinct margins at 6 o'clock 3 cm FN in the left breast, and a 2.0 cm oval mass with indistinct margins and associated calcifications at 630 o'clock 4 cm FN. US of the left breast revealed 3 oval hypoechoic masses at 6 o'clock 3-4 cm FN, largest measuring 1.1 cm x 0.7 cm x 0.9 cm and two adjacent masses measuring 0.5 cm and 0.3 cm. There was also a 2.0 cm x 1.4 cm x 1.1 cm oval hypoechoic mass with indistinct margins at 630 o'clock 4 cm FN with associated echogenic foci consistent with calcifications. BIRADS-4: suspicious and biopsy is recommended.    3. 2020 PET CT at [facility] - which revealed inferior left breast hypermetabolic mass consistent with biopsy-proven carcinoma and right anterior fourth rib hypermetabolic sclerosis suspected to be metastatic    4. 2020 MRI Brain at [facility] - which revealed no acute intracranial findings or metastatic evidence and mild chronic microangiographic ischemia.    5. 2020 BL Breast MRI at [facility] - which revealed at 6:30 in the left breast 4m FN a 2.1  cm x 2.1 cm x 1.9 cm heterogeneously enhancing mass with irregular margins and signal void consistent with biopsy-proven malignancy is seen, at 6 o'clock 3-4 cm FN there is are 5 oval, enhancing masses with non-circumscribed margins: 1.1 cm x 0.9 cm x 1.1 cm (signal void consistent with a biopsy-proven malignancy),and four additional masses ranging in size from 0.6 cm to 1.0 cm. There is an 9 mm enhancing mass within the right anterior fourth rib corresponding to spot seen on PET CT and trace right-sided pleural effusion.    6. 10/14/2020 PET CT at [facility] -which revealed mild interval enlargement and increased FDG avidity the left breast mass, anterior right 4th rib lesion appears more sclerotic in appearance compared to previous with decreased hypermetabolic uptake, and otherwise no evidence for metastatic disease    7. 2/1/2021 BL Breast MRI at [facility] - which revealed in the right breast no suspicious areas of enhancement or areas of architectural distortion, and the left breast 2 clips denote sites of known malignancy at 6:00 and 630 the previously described enhancing mass is no longer enhancing on this current MRI, no skin thickening or nipple retraction is noted, and no axillary or internal mammary lymph nodes are present. The enhancing mass that was within the right anterior fourth rib is less conspicuous on current exam. While the mass is nonspecific cannot exclude metastatic disease.    8. 3/21/2021 CT Head W/o Contrast (due to head trauma) at [facility] ED - No obvious acute abnormality.    9. 3/21/2021 CT CHEST (PE Study) at [facility] ED - 1.  Findings compatible with pulmonary thromboembolism involving the distal right and left pulmonary arteries extending into the segmental branches of the lingula, left lower lobe, right upper lobe, and right lower lobe.  There is mild right-sided heart prominence and component of right-sided heart strain is not excluded on the basis of this study.  2.  Postsurgical change of  left-sided mastectomy with surgical drainage catheter in place.  There is mild induration of the subcutaneous soft tissues, presumably postoperative in etiology without discrete drainable fluid collection of the left chest wall.  3.  Mildly patulous esophagus with high density material layering in the midthoracic esophagus which may relate to recently administered/ingested medication.  Clinical correlation is recommended.  4.  Two well-circumscribed hepatic hypodensities possibly representing cysts.  Additional questionable 1.5 cm left adrenal nodule in the partially visualized upper abdomen.  Correlation with any prior abdominal preoperative imaging advised in this patient with history of breast malignancy.  Nonemergent dedicated abdominal CT follow-up could be performed for further characterization if not previously done.    10. 6/10/2021 R SCR MG at [facility] - BIRADS 2: BENIGN: A subcutaneous infusion port partially obscures evaluation of the right axilla.   Diffuse calcifications in the right breast are without signficant change.   No new concerning masses, calcifications, or other findings are seen in the right breast.  There has been no significant interval change.    10. 6/18/2022 R SCR MG at [facility] - BIRADS 2: BENIGN: A port-a-catheter partially obscures evaluation of the right axilla. There are morphologically similar calcifications diffusely distributed throughout the right breast, with slow increase in their number over the years. These are considered benign.  No significant masses, calcifications, or other findings are seen in the breast.  There has been no significant interval change.       Pathology:  1. 6/26/2020 US-Guided Core Biopsy Left Breast 2 cm Mass 630 o'clock 4 cm FN - INVASIVE DUCTAL CARCINOMA, GRADE 3 WITH MICROCALCIFICATIONS  ER 0.00%, MN 0/00%, HER2 1+ on IHC/negative.    2. 6/26/2020 US-Guided Core Biopsy Left breast 1.1 cm Mass 6 o'clock 3-4 cm FN - INVASIVE DUCTAL CARCINOMA, GRADE 3 WITH  MICROCALCIFICATIONS  ER 0.00%, ID 0/00%, HER2 1+ on IHC/negative.    OB/GYN History:  Menarche Onset: 11  Menopause: Post, at age: 42  Hormonal birth control (duration): yes, 20years started at age 26  Pregnancies: 1  Age at first pregnancy: 25  Child births: 1  Breastfeeding duration: no   Hysterectomy: no  Oophorectomy: no  HRT: no    Other:  # of breast biopsies (when and pathology results): none  MG breast density: Category B (Scattered fibroglandular)  Prior thoracic RT: none  Genetic testing: none  Ashkenazi Worship descent: No    Family History:  Family History   Problem Relation Age of Onset    Colon cancer Brother     Pancreatic cancer Other         Patient History:  Past Medical History:   Diagnosis Date    Breast cancer     HLD (hyperlipidemia)     HTN (hypertension)     Pulmonary embolism     Schizophrenia        Past Surgical History:   Procedure Laterality Date    MASTECTOMY  02/2021       Social History     Socioeconomic History    Marital status:    Tobacco Use    Smoking status: Never Smoker    Smokeless tobacco: Never Used   Substance and Sexual Activity    Alcohol use: Not Currently    Drug use: Never         There is no immunization history on file for this patient.    Medications/Allergies:  Current Outpatient Medications on File Prior to Visit   Medication Sig Dispense Refill    acetaminophen (TYLENOL) 650 MG TbSR Take 650 mg by mouth nightly as needed.      alendronate (FOSAMAX) 70 MG tablet Take 70 mg by mouth every 7 days. On tuesdays      apixaban (ELIQUIS) 5 mg Tab Take 2 tablets (10mg total) by mouth 2 (two) times daily for 7 days then take 1 tablet (5mg total) by mouth 2 (two) times daily. 180 tablet 1    atorvastatin (LIPITOR) 40 MG tablet Take 40 mg by mouth once daily.       atorvastatin (LIPITOR) 40 MG tablet 1 tablet      baclofen (LIORESAL) 10 MG tablet Take 10 mg by mouth once daily.      benzonatate (TESSALON) 100 MG capsule TAKE 1 CAPSULE BY MOUTH THREE  TIMES DAILY FOR 7 DAYS AS NEEDED FOR COUGH      benztropine (COGENTIN) 2 MG Tab Take 2 mg by mouth 2 (two) times daily.      calcium-vitamin D 600 mg-10 mcg (400 unit) Tab 1 tab      ezetimibe (ZETIA) 10 mg tablet 1 tablet      fexofenadine (ALLEGRA) 60 MG tablet Take 60 mg by mouth once daily.      fluticasone furoate (ARNUITY ELLIPTA) 100 mcg/actuation inhaler Inhale 1 puff into the lungs once daily.       fluticasone propionate (FLONASE) 50 mcg/actuation nasal spray 2 sprays by Each Nostril route once daily.      fluticasone propionate (FLONASE) 50 mcg/actuation nasal spray 2 sprays      gabapentin (NEURONTIN) 300 MG capsule Take 300 mg by mouth 3 (three) times daily.       guaiFENesin (MUCINEX) 600 mg 12 hr tablet Take 1,200 mg by mouth 2 (two) times daily as needed for Congestion (allergies).      haloperidoL (HALDOL) 5 MG tablet Take 5 mg by mouth 2 (two) times daily.      indapamide (LOZOL) 2.5 MG Tab Take 2.5 mg by mouth once daily.      indapamide (LOZOL) 2.5 MG Tab Take 1 tablet by mouth once daily.      iron-vitamin C 100-250 mg, ICAR-C, 100-250 mg Tab Take 1 tablet by mouth once daily.      linaCLOtide (LINZESS) 290 mcg Cap capsule Take 290 mcg by mouth before breakfast.       linaCLOtide (LINZESS) 290 mcg Cap capsule TAKE ONE CAPSULE BY MOUTH ONCE EVERY DAY      loratadine (CLARITIN) 10 mg tablet 1 tablet      losartan (COZAAR) 50 MG tablet Take 1 tablet by mouth once daily.      magnesium oxide (MAG-OX) 400 mg (241.3 mg magnesium) tablet 1 tablet with food      metoprolol succinate (TOPROL-XL) 50 MG 24 hr tablet Take 1 tablet by mouth once daily.      mirabegron (MYRBETRIQ) 50 mg Tb24 1 tablet      multivit-min-iron-FA-lutein (CENTRUM SILVER WOMEN) 8 mg iron-400 mcg-300 mcg Tab       naproxen (NAPROSYN) 500 MG tablet 1 tablet with food or milk as needed      omega-3 acid ethyl esters (LOVAZA) 1 gram capsule       pantoprazole (PROTONIX) 40 MG tablet 1 tablet      potassium  chloride (K-TAB) 20 mEq 1 tablet with food      propylene glycoL (SYSTANE BALANCE) 0.6 % Drop       simethicone (GAS FREE EXTRA STRENGTH ORAL)       terazosin (HYTRIN) 2 MG capsule 1 capsule at bedtime      trazodone (DESYREL) 25 MG tablet Take 50 mg by mouth nightly as needed for Insomnia.       [DISCONTINUED] potassium triplates (POTASSIUM ACET, BICARB,&CITRAT ORAL) Take 20 mEq by mouth.       No current facility-administered medications on file prior to visit.       Review of patient's allergies indicates:   Allergen Reactions    Iodine Other (See Comments)     Other reaction(s): Unknown       Review of Systems:  A comprehensive review of systems was negative. (other than +left hip pain (chronic))     Objective:     Vitals:  Vitals:    06/28/22 1014   BP: 119/68   Pulse: 77   Resp: 18   Temp: 97.3 °F (36.3 °C)       Body mass index is 29.12 kg/m².     Physical Exam:  General: The patient is awake, alert and oriented times three. The patient is well nourished and in no acute distress.  Neck: There is no evidence of palpable cervical, supraclavicular or axillary adenopathy. The neck is supple. The thyroid is not enlarged.  Musculoskeletal: The patient has a normal range of motion of her bilateral upper extremities.  Chest: Examination of the chest wall fails to reveal any obvious abnormalities. Nonlabored breathing, symmetric expansion.  Breast:  Right: Examination of right breast fails to reveal any dominant masses or areas of significant focal nodularity. The nipple is everted without evidence of discharge. There is no skin dimpling with movement of the pectoralis. There are no significant skin changes overlying the breast.   Left: Examination of left mastectomy site fails to reveal any dominant masses or areas of significant focal nodularity. The nipple is surgically absent. There are no significant skin changes overlying the breasts. There is no skin dimpling with movement of the pectoralis.  Abdomen: The  abdomen is soft, flat, nontender and nondistended.  Integumentary: no rashes or skin lesions present  Neurologic: cranial nerves intact, no signs of peripheral neurological deficit, motor/sensory function intact      Assessment and Plan:       Nancy was seen today for follow-up.    Diagnoses and all orders for this visit:    Encounter for follow-up surveillance of breast cancer    History of left mastectomy          Plan:     Recommend correlating CT Chest 3/21/2021 at [facility] to prior abdominal imaging. If no correlate seen, will recommend CT Abdomen.     Patient needs to follow up with PCP regarding reported vaginal bleeding. Will fax today's progress note to PCP and Nursing Home with note stating patient needs to be seen for vaginal bleeding, if not already being seen for this.     A routine R screening mammogram in one year in the absence of significant clinical findings in the interval is recommended (June 2022). - already scheduled.    RTC in 6 months for breast cancer surveillance.    Continue f/u with PCP and medical oncology.        All of her questions were answered.     Zarina Francois PA-C    ADDENDUM 7/5/2022: Spoke with Dr. Matt Gomes who reviewed the 3/2021 PE CT study and the [facility] PET images from 10/14/2020. Liver lesions or left adrenal nodule are not clearly visualized on the PET scan. This could mean they are new since 2020, or it could be due to the low dose noncontrast CT protocol used for the PET studies.      PLAN: Recommend proceeding with the dedicated abdominal CT and specify adrenal protocol (will let us measure washout values, as well as provide multiphase eval of the liver findings).       Total time on the date of the visit ranged from 30-39 mins (42853). Total time includes both face-to-face and non-face-to-face time personally spent by myself on the day of the visit.    Non-face-to-face time included:  _X_ preparing to see the patient such as reviewing the patient record  __  obtaining and reviewing separately obtained history  _X_ independently interpreting results  _X_ documenting clinical information in electronic health record.    Face-to-face time included:  _X_ performing an appropriate history and examination  _X_ communicating results to the patient  _X_ counseling and educating the patient  __ ordering appropriate medications  _x_ ordering appropriate tests  _X_ ordering appropriate procedures (including follow-up)  _X_ answering any questions the patient had    Total Time spent on date of visit: 35 minutes

## 2022-08-04 NOTE — PROGRESS NOTES
DATE:  08/08/2022     PROBLEM:  Stage IIA / prognostic stage IIB (cT2 cN0 M0 ) multifocal Left invasive ductal carcinoma grade 3 ER 0%, NE 0%, HER2 1+ - negative on IHC .  Atypical right 4th rib lesion, possibly M1 disease, diagnosed summer of 2020    HxPI:   The patient presented for routine screening mammogram on June 8, 2020. That mammogram showed a developing asymmetry at 6:00 in the left breast. On 6/18 she underwent an ultrasound and bilateral diagnostic mammograms on June 18 2020. The ultrasound showed 3 oval hypoechoic masses the largest of which was 11 mm in the left breast. Benign appearing lymph nodes in the left axilla. She went back and had an ultrasound-guided biopsy on June 23, 2020 of 2 areas : one of the 2 cm mass in the left breast and the 1.1 cm mass in the left breast. Surgical pathology of both biopsies showed invasive ductal carcinoma both of which were ER NE negative, HER-2/haritha negative. She was seen and evaluated by Dr. Melo on 7/7/2020. She was then referred to [facility] for further evaluation and Rx.    She had PET CT 7/2020 that showed left breast lesion, b/l benign appearing LNs and a right 4th rib sclerotic lesion 10x5mm SUV 4.36    There was significant delay in treatment initiation due to transferring care to [facility]. Due to this, a repeat PET CT and repeat MRI were obtained. No significant changes were noted other than SUV of right sclerotic 4th rib lesion had decreased to 1.62    Decision made to proceed with NACT for stage II disease. Due to her multiple medical comorbidities and overall performance status she was given weekly taxol alone which was started on 10/19/20 and completed on 1/7/21    On 2/22/21 she underwent a left simple mastectomy and SLNE showing ypT1a ypN0 G3 IDC, residual focus of only 3mm, with extensive chemotherapeutic effect.    PMHx: schizophrenia, osteoporosis, HTN, HLD, IBS, DJD,asthma, urinary incontinence, diverticulosis  PSHx: back surgery, tonsillectomy,  bladder surgery, D&C x2, sinus surgery, right knee surgery  Social hx: never smoker, no drugs, no ETOH  Family Hx: maternal aunt: pancreatic cancer, brother: colon cancer  Obgyn hx: Menarche at 11, menopause at 47, no history of hormone replacement therapy no previous biopsies, first birth 25  Meds: reviewed    Onc Hx:  10/19/20-21: weekly taxol X12    INTERVAL Hx:  2022  Feels well at this time.  Voices no complaints.  Eleven appetite good.  Coming in for a well-cancer patient recheck.      LABS:   2022- New labs are not available at the time of this evaluation  21 Cr 0.8 K 3 Alb 3.2WBC 6.2 Hg 11.9     IMAGIN2020 BL SC MG at [facility] - revealing an area of calcification sin the central right breast and a developing asymmetry in the left breast at 6:00 o'clock. BIRADS-0: additional imaging recommended    2020 BL DG MG and Left US at [facility] - revealing benign appearing scattered, punctate, calcifications in the central right breast, a 1.1 cm oval mas with indistinct margins at 6 o'clock 3 cm FN in the left breast, and a 2.0 cm oval mass with indistinct margins and associated calcifications at 630 o'clock 4 cm FN. US of the left breast revealed 3 oval hypoechoic masses at 6 o'clock 3-4 cm FN, largest measuring 1.1 cm x 0.7 cm x 0.9 cm and two adjacent masses measuring 0.5 cm and 0.3 cm. There was also a 2.0 cm x 1.4 cm x 1.1 cm oval hypoechoic mass with indistinct margins at 630 o'clock 4 cm FN with associated echogenic foci consistent with calcifications. BIRADS-4: suspicious and biopsy is recommended.    20 PET CT: Within the inferior portion of the left breast slightly right paramedian is an irregularly defined hypermetabolic mass which measures 2.0 x 1.0 cm on axial image 61 series 4 with SUV of 3.67. There are bilateral axillary mildly enlarged but nonpathologic appearing lymph nodes with normal fatty hilum and none of these show hypermetabolism. There is also no evidence  of hypermetabolic lymphadenopathy within the internal mammary chain, mediastinum or hilum.  Right anterior fourth rib hypermetabolic sclerosis measures 10 mm x 5 mm with SUV of 4.36 on image 58 series 4.    7/30/2020, MRI of the brain no intracranial findings    9/14/2020, MRI of the breast:  LEFT BREAST:   In the 6:30 left breast, 4 cm from the nipple, there is a 21 x 21 x 19 mm (axial image 89/128) oval, heterogeneously enhancing mass with irregular margins. Signal void consistent with a biopsy clip is associated with this known malignancy.   In the 6:00 left breast, 3 to 4 cm from the nipple, there are 5 oval, enhancing masses with noncircumscribed margins. These masses are as follows:   - 11 x 9 x 11 mm (axial image 91/128); there is a signal void consistent with a biopsy clip associated with this known malignancy.  - 6 x 4 x 6 mm (axial image 91/128)  - 10 x 4 x 7 mm (axial image 91/128)  - 6 x 6 x 6 mm (axial image 91/128)  - 6 x 4 x 6 mm (axial image 93/128); this mass is the most posterior of the masses and is 9 mm anterior to the chest wall.  There is no skin thickening or nipple retraction. No axillary or internal mammary lymphadenopathy is identified.  OTHER: There is a 9 mm enhancing mass within the right anterior fourth rib. This corresponds to the hypermetabolic lesion seen on PET CT dated 7/30/2020. A trace right-sided pleural effusion is present.    10/14/20 PET/CT: Mild interval enlargement and increased FDG avidity of the left breast mass. More sclerotic appearance of the anterior right fourth rib lesion, with decrease hypermetabolic uptake, maximum SUV is 1.62    12/1/20 MUGA with LVEF 70%    2/1/21 MRI b/l breasts: no residual abnormal enhancement is seen in the left breast at the sites of the known malignancy in the 6:00 and 6:30 left breast, indicating a favorable response to neoadjuvant chemotherapy. Enhancing mass within the right anterior fourth rib is less conspicuous on the current exam.  While the mass is nonspecific, metastatic disease remains in the differential.    6/10/21 MG KWABENA RT Breast: Diffuse calcifications in the right breast are without significant change. RONAL noted. F/U screening X 1 year (6/2022).    PATHOLOGY:  1. 6/26/2020 US-Guided Core Biopsy Left Breast 2 cm Mass 630 o'clock 4 cm FN - INVASIVE DUCTAL CARCINOMA, GRADE 3 WITH MICROCALCIFICATIONS  ER 0.00%, GA 0.00%, HER2 1+ on IHC/negative.  2. 6/26/2020 US-Guided Core Biopsy Left breast 1.1 cm Mass 6 o'clock 3-4 cm FN - INVASIVE DUCTAL CARCINOMA, GRADE 3 WITH MICROCALCIFICATIONS  ER 0.00%, GA 0.00%, HER2 1+ on IHC/negative.    2/22/21 left simple mastectomy: residual IDC G3, 3mm, 6 oclock position. Margins neg, closest is inferior/anterior at 10mm. No LVI. 2 axillary SLN negative. Positive response to NACT. ypT1a ypN0    ROS:  CONSTITUTIONAL: no fevers, no chills, no weight loss, + fatigue, + weakness  HEMATOLOGIC: no abnormal bleeding, no abnormal bruising, no drenching night sweats  ONCOLOGIC: no new masses or lumps  HEENT: no vision loss, no tinnitus or hearing loss, no nose bleeding, no dysphagia, no odynophagia  CVS: no chest pain, no palpitations, no dyspnea on exertion  RESP: no shortness of breath, no hemoptysis, no cough  BREAST: no nipple discharge, no breast tenderness, no breast masses on self breast examination  GI: no nausea, no vomiting, no diarrhea, no constipation, no melena, no hematochezia, no hematemesis, no abdominal pain, no increase in abdominal girth  : no dysuria, no hematuria, no discharge  GYN: +abnormal vaginal bleeding, no dyspareunia, no vaginal discharge  INTEGUMENT: no rashes, no abnormal bruising, no nail pitting, no hyperpigmentation  NEURO: no falls, no memory loss, no paresthesias or dysesthesias, no urofecal incontinence or retention, no loss of strength on any extremity  MSK: no back pain, +pain in left hip, no joint swelling  PSYCH: no suicidal or homicidal ideation, no depression, no  insomnia, no anhedonia  ENDOCRINE: no heat or cold intolerance, no polyuria, no polydipsia    OBSERVATIONS:  GENERAL: AAOx3, NAD  VS:   HEENT: NCAT, PERRLA, EOMI, fair dentition, no oral ulcers  LYMPH: no cervical, axillary or supraclavicular adenopathy  CVS: s1s2 RRR, no M/R/G  RESP: CTA b/l, no crackles, no wheezes or rhonchi  Breast: Left: s/p mastectomy, Right: no masses, lumps, no nipple inversion, no peau d'orange  ABD: soft, NT, ND, BS+, no hepatosplenomegaly  EXT: no deformities, + 2 BLE edema, no pedal edema  SKIN: no rashes, no bruises or purpura, warm and dry  NEURO: normal mentation, strength 5/5 on all 4 extremities, no sensory deficits    ASSESSMENT:   1. Cancer of overlapping sites of left female breast C50.812  Stage IIA / prognostic stage IIB (cT2 cN0 M0 ) multifocal Left invasive ductal carcinoma grade 3 ER 0%, MO 0%, HER2 1+ - negative on IHC .  Atypical right 4th rib lesion, possibly M1 disease. Completed 12 weeks of taxol. Post treatment MRI b/l breasts 2/1/21 shows complete response. Enhancing mass within the right anterior fourth rib is less conspicuous. While the mass is nonspecific, metastatic disease remains in the differential. L simple mastectomy and SLNE with ypT1a ypN0 with extensive pathologic response and only minimal residual IDC of 3mm. She was referred to [facility], RT not recommended due to multiple comorbidities. MD discussed possibility of adjuvant Xeloda, but respectfully declined.    PLAN:  Her patient's wishes, she will be managed with surveillance only at this point.  Next follow-up in 6 months.  CBC and CMP on RTC.  Recommend holding off on additional imaging studies for now, but it might be wise to get one set up after her next visit in 02/2023.  TANYA MI M.D., FACP

## 2022-08-08 ENCOUNTER — OFFICE VISIT (OUTPATIENT)
Dept: HEMATOLOGY/ONCOLOGY | Facility: CLINIC | Age: 72
End: 2022-08-08
Payer: MEDICARE

## 2022-08-08 VITALS
BODY MASS INDEX: 28.65 KG/M2 | WEIGHT: 161.69 LBS | TEMPERATURE: 99 F | RESPIRATION RATE: 18 BRPM | HEART RATE: 70 BPM | HEIGHT: 63 IN | DIASTOLIC BLOOD PRESSURE: 88 MMHG | OXYGEN SATURATION: 99 % | SYSTOLIC BLOOD PRESSURE: 130 MMHG

## 2022-08-08 DIAGNOSIS — Z17.1 MALIGNANT NEOPLASM OF OVERLAPPING SITES OF LEFT BREAST IN FEMALE, ESTROGEN RECEPTOR NEGATIVE: Primary | ICD-10-CM

## 2022-08-08 DIAGNOSIS — C50.812 MALIGNANT NEOPLASM OF OVERLAPPING SITES OF LEFT BREAST IN FEMALE, ESTROGEN RECEPTOR NEGATIVE: Primary | ICD-10-CM

## 2022-08-08 PROCEDURE — 1159F MED LIST DOCD IN RCRD: CPT | Mod: CPTII,,, | Performed by: INTERNAL MEDICINE

## 2022-08-08 PROCEDURE — 3075F SYST BP GE 130 - 139MM HG: CPT | Mod: CPTII,,, | Performed by: INTERNAL MEDICINE

## 2022-08-08 PROCEDURE — 3288F PR FALLS RISK ASSESSMENT DOCUMENTED: ICD-10-PCS | Mod: CPTII,,, | Performed by: INTERNAL MEDICINE

## 2022-08-08 PROCEDURE — 1126F AMNT PAIN NOTED NONE PRSNT: CPT | Mod: CPTII,,, | Performed by: INTERNAL MEDICINE

## 2022-08-08 PROCEDURE — 1159F PR MEDICATION LIST DOCUMENTED IN MEDICAL RECORD: ICD-10-PCS | Mod: CPTII,,, | Performed by: INTERNAL MEDICINE

## 2022-08-08 PROCEDURE — 1126F PR PAIN SEVERITY QUANTIFIED, NO PAIN PRESENT: ICD-10-PCS | Mod: CPTII,,, | Performed by: INTERNAL MEDICINE

## 2022-08-08 PROCEDURE — 1160F RVW MEDS BY RX/DR IN RCRD: CPT | Mod: CPTII,,, | Performed by: INTERNAL MEDICINE

## 2022-08-08 PROCEDURE — 99214 PR OFFICE/OUTPT VISIT, EST, LEVL IV, 30-39 MIN: ICD-10-PCS | Mod: ,,, | Performed by: INTERNAL MEDICINE

## 2022-08-08 PROCEDURE — 3008F PR BODY MASS INDEX (BMI) DOCUMENTED: ICD-10-PCS | Mod: CPTII,,, | Performed by: INTERNAL MEDICINE

## 2022-08-08 PROCEDURE — 1160F PR REVIEW ALL MEDS BY PRESCRIBER/CLIN PHARMACIST DOCUMENTED: ICD-10-PCS | Mod: CPTII,,, | Performed by: INTERNAL MEDICINE

## 2022-08-08 PROCEDURE — 3075F PR MOST RECENT SYSTOLIC BLOOD PRESS GE 130-139MM HG: ICD-10-PCS | Mod: CPTII,,, | Performed by: INTERNAL MEDICINE

## 2022-08-08 PROCEDURE — 3288F FALL RISK ASSESSMENT DOCD: CPT | Mod: CPTII,,, | Performed by: INTERNAL MEDICINE

## 2022-08-08 PROCEDURE — 3079F PR MOST RECENT DIASTOLIC BLOOD PRESSURE 80-89 MM HG: ICD-10-PCS | Mod: CPTII,,, | Performed by: INTERNAL MEDICINE

## 2022-08-08 PROCEDURE — 1101F PR PT FALLS ASSESS DOC 0-1 FALLS W/OUT INJ PAST YR: ICD-10-PCS | Mod: CPTII,,, | Performed by: INTERNAL MEDICINE

## 2022-08-08 PROCEDURE — 1101F PT FALLS ASSESS-DOCD LE1/YR: CPT | Mod: CPTII,,, | Performed by: INTERNAL MEDICINE

## 2022-08-08 PROCEDURE — 4010F PR ACE/ARB THEARPY RXD/TAKEN: ICD-10-PCS | Mod: CPTII,,, | Performed by: INTERNAL MEDICINE

## 2022-08-08 PROCEDURE — 99214 OFFICE O/P EST MOD 30 MIN: CPT | Mod: ,,, | Performed by: INTERNAL MEDICINE

## 2022-08-08 PROCEDURE — 4010F ACE/ARB THERAPY RXD/TAKEN: CPT | Mod: CPTII,,, | Performed by: INTERNAL MEDICINE

## 2022-08-08 PROCEDURE — 3079F DIAST BP 80-89 MM HG: CPT | Mod: CPTII,,, | Performed by: INTERNAL MEDICINE

## 2022-08-08 PROCEDURE — 3008F BODY MASS INDEX DOCD: CPT | Mod: CPTII,,, | Performed by: INTERNAL MEDICINE

## 2022-12-12 ENCOUNTER — DOCUMENTATION ONLY (OUTPATIENT)
Dept: INTERNAL MEDICINE | Facility: CLINIC | Age: 72
End: 2022-12-12
Payer: MEDICARE

## 2022-12-13 DIAGNOSIS — Z17.1 MALIGNANT NEOPLASM OF OVERLAPPING SITES OF LEFT BREAST IN FEMALE, ESTROGEN RECEPTOR NEGATIVE: Primary | ICD-10-CM

## 2022-12-13 DIAGNOSIS — C50.812 MALIGNANT NEOPLASM OF OVERLAPPING SITES OF LEFT BREAST IN FEMALE, ESTROGEN RECEPTOR NEGATIVE: Primary | ICD-10-CM

## 2023-01-10 PROBLEM — E87.1 ACUTE HYPONATREMIA: Status: ACTIVE | Noted: 2023-01-10

## 2023-01-11 PROBLEM — E87.6 HYPOKALEMIA: Status: ACTIVE | Noted: 2023-01-11

## 2023-01-17 PROBLEM — F02.80: Status: ACTIVE | Noted: 2023-01-17

## 2023-01-17 PROBLEM — D64.9 NORMOCYTIC ANEMIA: Status: ACTIVE | Noted: 2023-01-17

## 2023-01-17 PROBLEM — F02.B0 MODERATE LATE ONSET ALZHEIMER'S DEMENTIA: Status: ACTIVE | Noted: 2023-01-17

## 2023-01-17 PROBLEM — G30.1 MODERATE LATE ONSET ALZHEIMER'S DEMENTIA: Status: ACTIVE | Noted: 2023-01-17

## 2023-01-17 PROBLEM — F20.5: Status: ACTIVE | Noted: 2023-01-17

## 2023-01-23 PROBLEM — Z95.828 PORT-A-CATH IN PLACE: Status: ACTIVE | Noted: 2023-01-23

## 2023-02-16 ENCOUNTER — OFFICE VISIT (OUTPATIENT)
Dept: HEMATOLOGY/ONCOLOGY | Facility: CLINIC | Age: 73
End: 2023-02-16
Payer: MEDICARE

## 2023-02-16 VITALS
WEIGHT: 153.81 LBS | RESPIRATION RATE: 18 BRPM | OXYGEN SATURATION: 100 % | HEIGHT: 63 IN | TEMPERATURE: 98 F | SYSTOLIC BLOOD PRESSURE: 133 MMHG | HEART RATE: 71 BPM | BODY MASS INDEX: 27.25 KG/M2 | DIASTOLIC BLOOD PRESSURE: 88 MMHG

## 2023-02-16 DIAGNOSIS — Z17.1 MALIGNANT NEOPLASM OF OVERLAPPING SITES OF LEFT BREAST IN FEMALE, ESTROGEN RECEPTOR NEGATIVE: Primary | ICD-10-CM

## 2023-02-16 DIAGNOSIS — C50.812 MALIGNANT NEOPLASM OF OVERLAPPING SITES OF LEFT BREAST IN FEMALE, ESTROGEN RECEPTOR NEGATIVE: Primary | ICD-10-CM

## 2023-02-16 PROCEDURE — 1159F PR MEDICATION LIST DOCUMENTED IN MEDICAL RECORD: ICD-10-PCS | Mod: CPTII,,,

## 2023-02-16 PROCEDURE — 1125F AMNT PAIN NOTED PAIN PRSNT: CPT | Mod: CPTII,,,

## 2023-02-16 PROCEDURE — 1159F MED LIST DOCD IN RCRD: CPT | Mod: CPTII,,,

## 2023-02-16 PROCEDURE — 3079F PR MOST RECENT DIASTOLIC BLOOD PRESSURE 80-89 MM HG: ICD-10-PCS | Mod: CPTII,,,

## 2023-02-16 PROCEDURE — 4010F ACE/ARB THERAPY RXD/TAKEN: CPT | Mod: CPTII,,,

## 2023-02-16 PROCEDURE — 99214 PR OFFICE/OUTPT VISIT, EST, LEVL IV, 30-39 MIN: ICD-10-PCS | Mod: ,,,

## 2023-02-16 PROCEDURE — 3075F PR MOST RECENT SYSTOLIC BLOOD PRESS GE 130-139MM HG: ICD-10-PCS | Mod: CPTII,,,

## 2023-02-16 PROCEDURE — 3288F FALL RISK ASSESSMENT DOCD: CPT | Mod: CPTII,,,

## 2023-02-16 PROCEDURE — 3288F PR FALLS RISK ASSESSMENT DOCUMENTED: ICD-10-PCS | Mod: CPTII,,,

## 2023-02-16 PROCEDURE — 1125F PR PAIN SEVERITY QUANTIFIED, PAIN PRESENT: ICD-10-PCS | Mod: CPTII,,,

## 2023-02-16 PROCEDURE — 3079F DIAST BP 80-89 MM HG: CPT | Mod: CPTII,,,

## 2023-02-16 PROCEDURE — 3075F SYST BP GE 130 - 139MM HG: CPT | Mod: CPTII,,,

## 2023-02-16 PROCEDURE — 3008F BODY MASS INDEX DOCD: CPT | Mod: CPTII,,,

## 2023-02-16 PROCEDURE — 3008F PR BODY MASS INDEX (BMI) DOCUMENTED: ICD-10-PCS | Mod: CPTII,,,

## 2023-02-16 PROCEDURE — 1111F DSCHRG MED/CURRENT MED MERGE: CPT | Mod: CPTII,,,

## 2023-02-16 PROCEDURE — 1111F PR DISCHARGE MEDS RECONCILED W/ CURRENT OUTPATIENT MED LIST: ICD-10-PCS | Mod: CPTII,,,

## 2023-02-16 PROCEDURE — 1101F PR PT FALLS ASSESS DOC 0-1 FALLS W/OUT INJ PAST YR: ICD-10-PCS | Mod: CPTII,,,

## 2023-02-16 PROCEDURE — 1101F PT FALLS ASSESS-DOCD LE1/YR: CPT | Mod: CPTII,,,

## 2023-02-16 PROCEDURE — 99214 OFFICE O/P EST MOD 30 MIN: CPT | Mod: ,,,

## 2023-02-16 PROCEDURE — 4010F PR ACE/ARB THEARPY RXD/TAKEN: ICD-10-PCS | Mod: CPTII,,,

## 2023-02-16 NOTE — PROGRESS NOTES
History of Present Illness  Consulting physician: Dr. Kira Melo    Chief complaint: anatomic stage IIA / prognostic stage IIB (cT2 cN0 M0 ) multifocal Left invasive ductal carcinoma grade 3 ER 0%, DC 0%, HER2 1+ - negative on IHC .  Atypical right 4th rib lesion, possibly M1 disease    Onc Hx:  10/19/20-1/7/21: weekly taxol X12    HPI: The patient presented for routine screening mammogram on June 8, 2020. That mammogram showed a developing asymmetry at 6:00 in the left breast. On 6/18 she underwent an ultrasound and bilateral diagnostic mammograms on June 18 2020. The ultrasound showed 3 oval hypoechoic masses the largest of which was 11 mm in the left breast. Benign appearing lymph nodes in the left axilla. She went back and had an ultrasound-guided biopsy on June 23, 2020 of 2 areas : one of the 2 cm mass in the left breast and the 1.1 cm mass in the left breast. Surgical pathology of both biopsies showed invasive ductal carcinoma both of which were ER DC negative, HER-2/haritha negative. She was seen and evaluated by Dr. Melo on 7/7/2020. She was then referred to [facility] for further eval    She had PET CT 7/2020 that showed left breast lesion, b/l benign appearing LNs and a right 4th rib sclerotic lesion 10x5mm SUV 4.36    There was significant delay in treatment initiation due to transferring care to [facility]. Due to this, a repeat PET CT and repeat MRI were obtained. No significant changes were noted other than SUV of right sclerotic 4th rib lesion had decreased to 1.62    Decision made to proceed with NACT for stage II disease. Due to her multiple medical comorbidities and overall performance status she was given weekly taxol alone which was started on 10/19/20 and completed on 1/7/21    On 2/22/21 she underwent a left simple mastectomy and SLNE showing ypT1a ypN0 G3 IDC, residual focus of only 3mm, with extensive chemotherapeutic effect.    PMHx: schizophrenia, osteoporosis, HTN, HLD, IBS, DJD,asthma, urinary  incontinence, diverticulosis  PSHx: back surgery, tonsillectomy, bladder surgery, D&C x2, sinus surgery, right knee surgery  Social hx: never smoker, no drugs, no ETOH  Family Hx: maternal aunt: pancreatic cancer, brother: colon cancer  Obgyn hx: Menarche at 11, menopause at 47, no history of hormone replacement therapy no previous biopsies, first birth 25  Meds: reviewed    Interval History:  Today 23: Patient here today for follow-up visit. She is doing well overall. She continues to report some fatigue and arthritic pains in her left hip. She reportvaginal bleeding resolved 4-5 months ago. Denies any dysuria or abdominal pain. She denies fevers, chills, shortness of breath, chest pain, N/V/C/D, melena, hematochezia, weight loss, bony pains, excessive fatigue or any other complaints. No other concerns at this time.    Labs:  23 cr 0.7, wbc 5.6, hgb 10.7, plt 249   21 Cr 0.8 K 3 Alb 3.2WBC 6.2 Hg 11.9   21 WBC 5.8, Hgb 12.1, , ANC 2.75, Cr 0.7, Alb 3.2, AlkPhos 87, K 3.1  21 WBC 8.71, Hgb 10.0, , ANC 5.75, Cr 0.62, Alb 3.3, AlkPhos 79, K 2.8  21 WBC 4.8 Hg 9.6  K 3 Alb 2.9 AlkPhos 73 Cr 0.9  20 , K 3.0, CR 0.71, TP 6.6, ALB 3.5, WBC 7.71, Hg 10.1, , ANC 5.15  20 K 3.2, Cr 0.70, TP 6.3, ALB 3.3, WBC 3.99, Hg 9.9, , ANC 2.56  20 , , Cr 0.67, TP 6.6, ALB 3.5, WBC 5.31, Hgb 10.1, , ANC 3.44  20 WBC 4.53 Hg11.2  ANC 2.53  20 K 3.4, glucose 159, Cr 0.63, TP 7.1, ALB 3.5, WBC 3.13, Hg 10.6, , ANC 1.88  20 Cr 0.7 K 3.2 Alb 3.6 AST 25 ALT 26 WBC 2.73 Hg 11.4  ANC 1.63  10/19/20 Cr 0.72, K 3.3, TP 7.6, alb 3.7, WBC 8.59, Hgb 12.9, , ANC 7.13    IMAGIN2020 BL SC MG at [facility] - revealing an area of calcification sin the central right breast and a developing asymmetry in the left breast at 6:00 o'clock. BIRADS-0: additional imaging recommended    2020 BL DG MG  and Left US at [facility] - revealing benign appearing scattered, punctate, calcifications in the central right breast, a 1.1 cm oval mas with indistinct margins at 6 o'clock 3 cm FN in the left breast, and a 2.0 cm oval mass with indistinct margins and associated calcifications at 630 o'clock 4 cm FN. US of the left breast revealed 3 oval hypoechoic masses at 6 o'clock 3-4 cm FN, largest measuring 1.1 cm x 0.7 cm x 0.9 cm and two adjacent masses measuring 0.5 cm and 0.3 cm. There was also a 2.0 cm x 1.4 cm x 1.1 cm oval hypoechoic mass with indistinct margins at 630 o'clock 4 cm FN with associated echogenic foci consistent with calcifications. BIRADS-4: suspicious and biopsy is recommended.    7/30/20 PET CT: Within the inferior portion of the left breast slightly right paramedian is an irregularly defined hypermetabolic mass which measures 2.0 x 1.0 cm on axial image 61 series 4 with SUV of 3.67. There are bilateral axillary mildly enlarged but nonpathologic appearing lymph nodes with normal fatty hilum and none of these show hypermetabolism. There is also no evidence of hypermetabolic lymphadenopathy within the internal mammary chain, mediastinum or hilum.  Right anterior fourth rib hypermetabolic sclerosis measures 10 mm x 5 mm with SUV of 4.36 on image 58 series 4.    7/30/2020, MRI of the brain no intracranial findings    9/14/2020, MRI of the breast:  6/18/2022 MMG right breast BIRADS 2- Benign  LEFT BREAST:   In the 6:30 left breast, 4 cm from the nipple, there is a 21 x 21 x 19 mm (axial image 89/128) oval, heterogeneously enhancing mass with irregular margins. Signal void consistent with a biopsy clip is associated with this known malignancy.   In the 6:00 left breast, 3 to 4 cm from the nipple, there are 5 oval, enhancing masses with noncircumscribed margins. These masses are as follows:   - 11 x 9 x 11 mm (axial image 91/128); there is a signal void consistent with a biopsy clip associated with this known  malignancy.  - 6 x 4 x 6 mm (axial image 91/128)  - 10 x 4 x 7 mm (axial image 91/128)  - 6 x 6 x 6 mm (axial image 91/128)  - 6 x 4 x 6 mm (axial image 93/128); this mass is the most posterior of the masses and is 9 mm anterior to the chest wall.  There is no skin thickening or nipple retraction. No axillary or internal mammary lymphadenopathy is identified.  OTHER: There is a 9 mm enhancing mass within the right anterior fourth rib. This corresponds to the hypermetabolic lesion seen on PET CT dated 7/30/2020. A trace right-sided pleural effusion is present.    10/14/20 PET/CT: Mild interval enlargement and increased FDG avidity of the left breast mass. More sclerotic appearance of the anterior right fourth rib lesion, with decrease hypermetabolic uptake, maximum SUV is 1.62    12/1/20 MUGA with LVEF 70%    2/1/21 MRI b/l breasts: no residual abnormal enhancement is seen in the left breast at the sites of the known malignancy in the 6:00 and 6:30 left breast, indicating a favorable response to neoadjuvant chemotherapy. Enhancing mass within the right anterior fourth rib is less conspicuous on the current exam. While the mass is nonspecific, metastatic disease remains in the differential.    6/10/21 MG KWABENA RT Breast: Diffuse calcifications in the right breast are without significant change. RONAL noted. F/U screening X 1 year (6/2022).    PATHOLOGY:  1. 6/26/2020 US-Guided Core Biopsy Left Breast 2 cm Mass 630 o'clock 4 cm FN - INVASIVE DUCTAL CARCINOMA, GRADE 3 WITH MICROCALCIFICATIONS  ER 0.00%, UT 0.00%, HER2 1+ on IHC/negative.  2. 6/26/2020 US-Guided Core Biopsy Left breast 1.1 cm Mass 6 o'clock 3-4 cm FN - INVASIVE DUCTAL CARCINOMA, GRADE 3 WITH MICROCALCIFICATIONS  ER 0.00%, UT 0.00%, HER2 1+ on IHC/negative.    2/22/21 left simple mastectomy: residual IDC G3, 3mm, 6 oclock position. Margins neg, closest is inferior/anterior at 10mm. No LVI. 2 axillary SLN negative. Positive response to NACT. ypT1a  ypN0    Review of Systems  CONSTITUTIONAL: no fevers, no chills, no weight loss, + fatigue, + weakness  HEMATOLOGIC: no abnormal bleeding, no abnormal bruising, no drenching night sweats  ONCOLOGIC: no new masses or lumps  HEENT: no vision loss, no tinnitus or hearing loss, no nose bleeding, no dysphagia, no odynophagia  CVS: no chest pain, no palpitations, no dyspnea on exertion  RESP: no shortness of breath, no hemoptysis, no cough  BREAST: no nipple discharge, no breast tenderness, no breast masses on self breast examination  GI: no nausea, no vomiting, no diarrhea, no constipation, no melena, no hematochezia, no hematemesis, no abdominal pain, no increase in abdominal girth  : no dysuria, no hematuria, no discharge  GYN: +abnormal vaginal bleeding, no dyspareunia, no vaginal discharge  INTEGUMENT: no rashes, no abnormal bruising, no nail pitting, no hyperpigmentation  NEURO: no falls, no memory loss, no paresthesias or dysesthesias, no urofecal incontinence or retention, no loss of strength on any extremity  MSK: no back pain, +pain in left hip, no joint swelling  PSYCH: no suicidal or homicidal ideation, no depression, no insomnia, no anhedonia  ENDOCRINE: no heat or cold intolerance, no polyuria, no polydipsia    Physical Exam  There were no vitals filed for this visit.  ECOG PS 2  GA: AAOx3, NAD  HEENT: NCAT, PERRLA, EOMI, fair dentition, no oral ulcers  LYMPH: no cervical, axillary or supraclavicular adenopathy  CVS: s1s2 RRR, no M/R/G  RESP: CTA b/l, no crackles, no wheezes or rhonchi  Breast: Left: s/p mastectomy, Right: no masses, lumps, no nipple inversion, no peau d'orange  ABD: soft, NT, ND, BS+, no hepatosplenomegaly  EXT: no deformities, + 2 BLE edema, no pedal edema  SKIN: no rashes, no bruises or purpura, warm and dry  NEURO: normal mentation, strength 5/5 on all 4 extremities, no sensory deficits    Assessment/Plan   1. Cancer of overlapping sites of left female breast C50.812  Multifocal left  breast IDC G3 TN T2N0 w/ suspicious right lytic rib lesion  10/14/20 PET/CT: Mild interval enlargement and increased FDG avidity of the left breast mass. More sclerotic appearance of the anterior right fourth rib lesion, with decreased hypermetabolic uptake, unchanged in comparison.  Mediport placed on 10/12/20 with Dr. Lorie RIVAS 12/1/20 with LVEF 70%  Completed 12 weeks of taxol  Post treatment MRI b/l breasts 2/1/21 shows complete response. Enhancing mass within the right anterior fourth rib is less conspicuous. While the mass is nonspecific, metastatic disease remains in the differential.  L simple mastectomy and SLNE with ypT1a ypN0 with extensive pathologic response and only minimal residual IDC of 3mm  She was referred to [facility], RT not recommended due to multiple comorbidities.  MD discussed possibility of adjuvant Xeloda. He was not sure how well she could tolerate it but we did offer it. She did not wish to try Xeloda and opted to proceed with surveillance monitoring.  MMG of right breast 6/21 normal. Repeat MMG Right breast due 1 year (6/2022), already ordered per surgery  Triple neg breast cancer, has still not done genetic counseling, (pt declines at this time)  C/w port flush    RTC in 4 months No labs needed  Continue f/u with Dr. Melo office for right breast MMG next due 6/2023  Call if any questions or concerns      Lizz Strauss, GTP-C

## 2023-03-09 DIAGNOSIS — I26.99 ACUTE PULMONARY EMBOLISM WITHOUT ACUTE COR PULMONALE, UNSPECIFIED PULMONARY EMBOLISM TYPE: ICD-10-CM

## 2023-04-24 PROBLEM — I26.99 ACUTE PULMONARY EMBOLISM WITHOUT ACUTE COR PULMONALE: Status: RESOLVED | Noted: 2021-03-21 | Resolved: 2023-04-24

## 2023-06-14 ENCOUNTER — OFFICE VISIT (OUTPATIENT)
Dept: HEMATOLOGY/ONCOLOGY | Facility: CLINIC | Age: 73
End: 2023-06-14
Payer: MEDICARE

## 2023-06-14 VITALS
BODY MASS INDEX: 26.93 KG/M2 | HEART RATE: 66 BPM | OXYGEN SATURATION: 100 % | RESPIRATION RATE: 20 BRPM | HEIGHT: 63 IN | WEIGHT: 152 LBS | TEMPERATURE: 98 F | DIASTOLIC BLOOD PRESSURE: 91 MMHG | SYSTOLIC BLOOD PRESSURE: 153 MMHG

## 2023-06-14 DIAGNOSIS — Z12.31 ENCOUNTER FOR SCREENING MAMMOGRAM FOR HIGH-RISK PATIENT: ICD-10-CM

## 2023-06-14 DIAGNOSIS — Z17.1 MALIGNANT NEOPLASM OF OVERLAPPING SITES OF LEFT BREAST IN FEMALE, ESTROGEN RECEPTOR NEGATIVE: Primary | ICD-10-CM

## 2023-06-14 DIAGNOSIS — C50.812 MALIGNANT NEOPLASM OF OVERLAPPING SITES OF LEFT BREAST IN FEMALE, ESTROGEN RECEPTOR NEGATIVE: Primary | ICD-10-CM

## 2023-06-14 PROCEDURE — 3077F SYST BP >= 140 MM HG: CPT | Mod: CPTII,,, | Performed by: NURSE PRACTITIONER

## 2023-06-14 PROCEDURE — 4010F PR ACE/ARB THEARPY RXD/TAKEN: ICD-10-PCS | Mod: CPTII,,, | Performed by: NURSE PRACTITIONER

## 2023-06-14 PROCEDURE — 3008F PR BODY MASS INDEX (BMI) DOCUMENTED: ICD-10-PCS | Mod: CPTII,,, | Performed by: NURSE PRACTITIONER

## 2023-06-14 PROCEDURE — 1125F AMNT PAIN NOTED PAIN PRSNT: CPT | Mod: CPTII,,, | Performed by: NURSE PRACTITIONER

## 2023-06-14 PROCEDURE — 99214 OFFICE O/P EST MOD 30 MIN: CPT | Mod: ,,, | Performed by: NURSE PRACTITIONER

## 2023-06-14 PROCEDURE — 4010F ACE/ARB THERAPY RXD/TAKEN: CPT | Mod: CPTII,,, | Performed by: NURSE PRACTITIONER

## 2023-06-14 PROCEDURE — 1160F PR REVIEW ALL MEDS BY PRESCRIBER/CLIN PHARMACIST DOCUMENTED: ICD-10-PCS | Mod: CPTII,,, | Performed by: NURSE PRACTITIONER

## 2023-06-14 PROCEDURE — 3080F PR MOST RECENT DIASTOLIC BLOOD PRESSURE >= 90 MM HG: ICD-10-PCS | Mod: CPTII,,, | Performed by: NURSE PRACTITIONER

## 2023-06-14 PROCEDURE — 1159F MED LIST DOCD IN RCRD: CPT | Mod: CPTII,,, | Performed by: NURSE PRACTITIONER

## 2023-06-14 PROCEDURE — 1125F PR PAIN SEVERITY QUANTIFIED, PAIN PRESENT: ICD-10-PCS | Mod: CPTII,,, | Performed by: NURSE PRACTITIONER

## 2023-06-14 PROCEDURE — 3080F DIAST BP >= 90 MM HG: CPT | Mod: CPTII,,, | Performed by: NURSE PRACTITIONER

## 2023-06-14 PROCEDURE — 1160F RVW MEDS BY RX/DR IN RCRD: CPT | Mod: CPTII,,, | Performed by: NURSE PRACTITIONER

## 2023-06-14 PROCEDURE — 3008F BODY MASS INDEX DOCD: CPT | Mod: CPTII,,, | Performed by: NURSE PRACTITIONER

## 2023-06-14 PROCEDURE — 3077F PR MOST RECENT SYSTOLIC BLOOD PRESSURE >= 140 MM HG: ICD-10-PCS | Mod: CPTII,,, | Performed by: NURSE PRACTITIONER

## 2023-06-14 PROCEDURE — 1159F PR MEDICATION LIST DOCUMENTED IN MEDICAL RECORD: ICD-10-PCS | Mod: CPTII,,, | Performed by: NURSE PRACTITIONER

## 2023-06-14 PROCEDURE — 99214 PR OFFICE/OUTPT VISIT, EST, LEVL IV, 30-39 MIN: ICD-10-PCS | Mod: ,,, | Performed by: NURSE PRACTITIONER

## 2023-06-14 NOTE — PROGRESS NOTES
History of Present Illness  Consulting physician: Dr. Kira Melo    Chief complaint: anatomic stage IIA / prognostic stage IIB (cT2 cN0 M0 ) multifocal Left invasive ductal carcinoma grade 3 ER 0%, VA 0%, HER2 1+ - negative on IHC .  Atypical right 4th rib lesion, possibly M1 disease    Onc Hx:  10/19/20-1/7/21: weekly taxol X12    HPI: The patient presented for routine screening mammogram on June 8, 2020. That mammogram showed a developing asymmetry at 6:00 in the left breast. On 6/18 she underwent an ultrasound and bilateral diagnostic mammograms on June 18 2020. The ultrasound showed 3 oval hypoechoic masses the largest of which was 11 mm in the left breast. Benign appearing lymph nodes in the left axilla. She went back and had an ultrasound-guided biopsy on June 23, 2020 of 2 areas : one of the 2 cm mass in the left breast and the 1.1 cm mass in the left breast. Surgical pathology of both biopsies showed invasive ductal carcinoma both of which were ER VA negative, HER-2/haritha negative. She was seen and evaluated by Dr. Melo on 7/7/2020. She was then referred to [facility] for further eval    She had PET CT 7/2020 that showed left breast lesion, b/l benign appearing LNs and a right 4th rib sclerotic lesion 10x5mm SUV 4.36    There was significant delay in treatment initiation due to transferring care to [facility]. Due to this, a repeat PET CT and repeat MRI were obtained. No significant changes were noted other than SUV of right sclerotic 4th rib lesion had decreased to 1.62    Decision made to proceed with NACT for stage II disease. Due to her multiple medical comorbidities and overall performance status she was given weekly taxol alone which was started on 10/19/20 and completed on 1/7/21    On 2/22/21 she underwent a left simple mastectomy and SLNE showing ypT1a ypN0 G3 IDC, residual focus of only 3mm, with extensive chemotherapeutic effect.    PMHx: schizophrenia, osteoporosis, HTN, HLD, IBS, DJD,asthma, urinary  incontinence, diverticulosis  PSHx: back surgery, tonsillectomy, bladder surgery, D&C x2, sinus surgery, right knee surgery  Social hx: never smoker, no drugs, no ETOH  Family Hx: maternal aunt: pancreatic cancer, brother: colon cancer  Obgyn hx: Menarche at 11, menopause at 47, no history of hormone replacement therapy no previous biopsies, first birth 25  Meds: reviewed    Interval History:  Today 2023 : Patient here today for follow-up visit. She is doing well overall. She is staying in a nursing home. Denies any breast complaints. Denies any dysuria or abdominal pain. She denies fevers, chills, shortness of breath, chest pain, N/V/C/D, melena, hematochezia, weight loss, bony pains, excessive fatigue, she has right shoulder, arm, hand pain that she follows with her PCP     Labs:  23 cr 0.7, wbc 5.6, hgb 10.7, plt 249   21 Cr 0.8 K 3 Alb 3.2WBC 6.2 Hg 11.9   21 WBC 5.8, Hgb 12.1, , ANC 2.75, Cr 0.7, Alb 3.2, AlkPhos 87, K 3.1  21 WBC 8.71, Hgb 10.0, , ANC 5.75, Cr 0.62, Alb 3.3, AlkPhos 79, K 2.8  21 WBC 4.8 Hg 9.6  K 3 Alb 2.9 AlkPhos 73 Cr 0.9  20 , K 3.0, CR 0.71, TP 6.6, ALB 3.5, WBC 7.71, Hg 10.1, , ANC 5.15  20 K 3.2, Cr 0.70, TP 6.3, ALB 3.3, WBC 3.99, Hg 9.9, , ANC 2.56  20 , , Cr 0.67, TP 6.6, ALB 3.5, WBC 5.31, Hgb 10.1, , ANC 3.44  20 WBC 4.53 Hg11.2  ANC 2.53  20 K 3.4, glucose 159, Cr 0.63, TP 7.1, ALB 3.5, WBC 3.13, Hg 10.6, , ANC 1.88  20 Cr 0.7 K 3.2 Alb 3.6 AST 25 ALT 26 WBC 2.73 Hg 11.4  ANC 1.63  10/19/20 Cr 0.72, K 3.3, TP 7.6, alb 3.7, WBC 8.59, Hgb 12.9, , ANC 7.13    IMAGIN2020 BL SC MG at [facility] - revealing an area of calcification sin the central right breast and a developing asymmetry in the left breast at 6:00 o'clock. BIRADS-0: additional imaging recommended    2020 BL DG MG and Left US at [facility] - revealing benign  appearing scattered, punctate, calcifications in the central right breast, a 1.1 cm oval mas with indistinct margins at 6 o'clock 3 cm FN in the left breast, and a 2.0 cm oval mass with indistinct margins and associated calcifications at 630 o'clock 4 cm FN. US of the left breast revealed 3 oval hypoechoic masses at 6 o'clock 3-4 cm FN, largest measuring 1.1 cm x 0.7 cm x 0.9 cm and two adjacent masses measuring 0.5 cm and 0.3 cm. There was also a 2.0 cm x 1.4 cm x 1.1 cm oval hypoechoic mass with indistinct margins at 630 o'clock 4 cm FN with associated echogenic foci consistent with calcifications. BIRADS-4: suspicious and biopsy is recommended.    7/30/20 PET CT: Within the inferior portion of the left breast slightly right paramedian is an irregularly defined hypermetabolic mass which measures 2.0 x 1.0 cm on axial image 61 series 4 with SUV of 3.67. There are bilateral axillary mildly enlarged but nonpathologic appearing lymph nodes with normal fatty hilum and none of these show hypermetabolism. There is also no evidence of hypermetabolic lymphadenopathy within the internal mammary chain, mediastinum or hilum.  Right anterior fourth rib hypermetabolic sclerosis measures 10 mm x 5 mm with SUV of 4.36 on image 58 series 4.    7/30/2020, MRI of the brain no intracranial findings    9/14/2020, MRI of the breast:  6/18/2022 MMG right breast BIRADS 2- Benign  LEFT BREAST:   In the 6:30 left breast, 4 cm from the nipple, there is a 21 x 21 x 19 mm (axial image 89/128) oval, heterogeneously enhancing mass with irregular margins. Signal void consistent with a biopsy clip is associated with this known malignancy.   In the 6:00 left breast, 3 to 4 cm from the nipple, there are 5 oval, enhancing masses with noncircumscribed margins. These masses are as follows:   - 11 x 9 x 11 mm (axial image 91/128); there is a signal void consistent with a biopsy clip associated with this known malignancy.  - 6 x 4 x 6 mm (axial image  91/128)  - 10 x 4 x 7 mm (axial image 91/128)  - 6 x 6 x 6 mm (axial image 91/128)  - 6 x 4 x 6 mm (axial image 93/128); this mass is the most posterior of the masses and is 9 mm anterior to the chest wall.  There is no skin thickening or nipple retraction. No axillary or internal mammary lymphadenopathy is identified.  OTHER: There is a 9 mm enhancing mass within the right anterior fourth rib. This corresponds to the hypermetabolic lesion seen on PET CT dated 7/30/2020. A trace right-sided pleural effusion is present.    10/14/20 PET/CT: Mild interval enlargement and increased FDG avidity of the left breast mass. More sclerotic appearance of the anterior right fourth rib lesion, with decrease hypermetabolic uptake, maximum SUV is 1.62    12/1/20 MUGA with LVEF 70%    2/1/21 MRI b/l breasts: no residual abnormal enhancement is seen in the left breast at the sites of the known malignancy in the 6:00 and 6:30 left breast, indicating a favorable response to neoadjuvant chemotherapy. Enhancing mass within the right anterior fourth rib is less conspicuous on the current exam. While the mass is nonspecific, metastatic disease remains in the differential.    6/10/21 MG KWABENA RT Breast: Diffuse calcifications in the right breast are without significant change. RONAL noted. F/U screening X 1 year (6/2022).    PATHOLOGY:  1. 6/26/2020 US-Guided Core Biopsy Left Breast 2 cm Mass 630 o'clock 4 cm FN - INVASIVE DUCTAL CARCINOMA, GRADE 3 WITH MICROCALCIFICATIONS  ER 0.00%, IN 0.00%, HER2 1+ on IHC/negative.  2. 6/26/2020 US-Guided Core Biopsy Left breast 1.1 cm Mass 6 o'clock 3-4 cm FN - INVASIVE DUCTAL CARCINOMA, GRADE 3 WITH MICROCALCIFICATIONS  ER 0.00%, IN 0.00%, HER2 1+ on IHC/negative.    2/22/21 left simple mastectomy: residual IDC G3, 3mm, 6 oclock position. Margins neg, closest is inferior/anterior at 10mm. No LVI. 2 axillary SLN negative. Positive response to NACT. ypT1a ypN0    Review of Systems  CONSTITUTIONAL: no  fevers, no chills, no weight loss, no fatigue, no weakness  HEMATOLOGIC: no abnormal bleeding, no abnormal bruising, no drenching night sweats  ONCOLOGIC: no new masses or lumps  HEENT: no vision loss, no tinnitus or hearing loss, no nose bleeding, no dysphagia, no odynophagia  CVS: no chest pain, no palpitations, no dyspnea on exertion  RESP: no shortness of breath, no hemoptysis, no cough  BREAST: no nipple discharge, no breast tenderness, no breast masses on self breast examination  GI: no nausea, no vomiting, no diarrhea, no constipation, no melena, no hematochezia, no hematemesis, no abdominal pain, no increase in abdominal girth  : no dysuria, no hematuria, no discharge  GYN: +abnormal vaginal bleeding, no dyspareunia, no vaginal discharge  INTEGUMENT: no rashes, no abnormal bruising, no nail pitting, no hyperpigmentation  NEURO: no falls, no memory loss, no paresthesias or dysesthesias, no urofecal incontinence or retention, no loss of strength on any extremity  MSK: no back pain, +pain in right shoulder, no joint swelling  PSYCH: no suicidal or homicidal ideation, no depression, no insomnia, no anhedonia  ENDOCRINE: no heat or cold intolerance, no polyuria, no polydipsia    Physical Exam  Vitals:    06/14/23 1313   BP: (!) 153/91   Pulse: 66   Resp: 20   Temp: 97.9 °F (36.6 °C)     ECOG PS 2  GA: AAOx3, NAD  HEENT: NCAT, PERRLA, EOMI, fair dentition, no oral ulcers  LYMPH: no cervical, axillary or supraclavicular adenopathy  CVS: s1s2 RRR, no M/R/G  RESP: CTA b/l, no crackles, no wheezes or rhonchi  Breast: Left: s/p mastectomy, Right: no masses, lumps, no nipple inversion, no peau d'orange  ABD: soft, NT, ND, BS+, no hepatosplenomegaly  EXT: no deformities, + 2 BLE edema, no pedal edema  SKIN: no rashes, no bruises or purpura, warm and dry  NEURO: normal mentation, strength 5/5 on all 4 extremities, no sensory deficits    Assessment/Plan   1. Cancer of overlapping sites of left female breast  C50.812  Multifocal left breast IDC G3 TN T2N0 w/ suspicious right lytic rib lesion  10/14/20 PET/CT: Mild interval enlargement and increased FDG avidity of the left breast mass. More sclerotic appearance of the anterior right fourth rib lesion, with decreased hypermetabolic uptake, unchanged in comparison.  Mediport placed on 10/12/20 with Dr. Lorie RIVAS 12/1/20 with LVEF 70%  Completed 12 weeks of taxol  Post treatment MRI b/l breasts 2/1/21 shows complete response. Enhancing mass within the right anterior fourth rib is less conspicuous. While the mass is nonspecific, metastatic disease remains in the differential.  L simple mastectomy and SLNE with ypT1a ypN0 with extensive pathologic response and only minimal residual IDC of 3mm  She was referred to [facility], RT not recommended due to multiple comorbidities.  MD discussed possibility of adjuvant Xeloda. He was not sure how well she could tolerate it but we did offer it. She did not wish to try Xeloda and opted to proceed with surveillance monitoring.  MMG of right breast 6/21 normal. Repeat MMG Right breast due 1 year (6/2022), already ordered per surgery  Triple neg breast cancer, has still not done genetic counseling, (pt declines at this time)  C/w port flush    RTC in 4 months No labs needed  MMG ordered today-virtual visit in 4 weeks for results   Continue f/u with Dr. Melo office   Port flush- appointment Friday   Call if any questions or concerns      NAYA Knox

## 2023-06-20 ENCOUNTER — TELEPHONE (OUTPATIENT)
Dept: HEMATOLOGY/ONCOLOGY | Facility: CLINIC | Age: 73
End: 2023-06-20
Payer: MEDICARE

## 2023-06-20 NOTE — TELEPHONE ENCOUNTER
----- Message from Tammy Graves sent at 6/19/2023 11:51 AM CDT -----  Regarding: RE: Please schedule  Mammo scheduled 06/26/23 @ [facility] w/ 10:00 arrival    Appt given to Kristen [place]  ----- Message -----  From: Kathya Gorman  Sent: 6/15/2023   3:19 PM CDT  To: Tammy Graves  Subject: Please schedule                                  Please schedule MMG @ [facility] before RTC in 4mths  Please see orders    Thanks

## 2023-06-26 ENCOUNTER — HOSPITAL ENCOUNTER (OUTPATIENT)
Dept: RADIOLOGY | Facility: HOSPITAL | Age: 73
Discharge: HOME | End: 2023-06-26
Attending: NURSE PRACTITIONER
Payer: MEDICARE

## 2023-06-26 DIAGNOSIS — Z17.1 MALIGNANT NEOPLASM OF OVERLAPPING SITES OF LEFT BREAST IN FEMALE, ESTROGEN RECEPTOR NEGATIVE: ICD-10-CM

## 2023-06-26 DIAGNOSIS — Z12.31 ENCOUNTER FOR SCREENING MAMMOGRAM FOR HIGH-RISK PATIENT: ICD-10-CM

## 2023-06-26 DIAGNOSIS — C50.812 MALIGNANT NEOPLASM OF OVERLAPPING SITES OF LEFT BREAST IN FEMALE, ESTROGEN RECEPTOR NEGATIVE: ICD-10-CM

## 2023-06-26 PROCEDURE — 77067 SCR MAMMO BI INCL CAD: CPT | Mod: TC,52

## 2023-06-26 PROCEDURE — 77063 MAMMO DIGITAL SCREENING RIGHT WITH TOMO: ICD-10-PCS | Mod: 26,52,, | Performed by: STUDENT IN AN ORGANIZED HEALTH CARE EDUCATION/TRAINING PROGRAM

## 2023-06-26 PROCEDURE — 77063 BREAST TOMOSYNTHESIS BI: CPT | Mod: 26,52,, | Performed by: STUDENT IN AN ORGANIZED HEALTH CARE EDUCATION/TRAINING PROGRAM

## 2023-06-26 PROCEDURE — 77067 MAMMO DIGITAL SCREENING RIGHT WITH TOMO: ICD-10-PCS | Mod: 26,52,, | Performed by: STUDENT IN AN ORGANIZED HEALTH CARE EDUCATION/TRAINING PROGRAM

## 2023-06-26 PROCEDURE — 77067 SCR MAMMO BI INCL CAD: CPT | Mod: 26,52,, | Performed by: STUDENT IN AN ORGANIZED HEALTH CARE EDUCATION/TRAINING PROGRAM

## 2023-07-10 ENCOUNTER — HOSPITAL ENCOUNTER (OUTPATIENT)
Dept: RADIOLOGY | Facility: HOSPITAL | Age: 73
Discharge: HOME | End: 2023-07-10
Attending: NURSE PRACTITIONER
Payer: MEDICARE

## 2023-07-10 DIAGNOSIS — R92.8 ABNORMAL SCREENING MAMMOGRAM: ICD-10-CM

## 2023-07-10 PROCEDURE — 77065 MAMMO DIGITAL DIAGNOSTIC RIGHT WITH TOMO: ICD-10-PCS | Mod: 26,RT,, | Performed by: RADIOLOGY

## 2023-07-10 PROCEDURE — 76642 US BREAST RIGHT LIMITED: ICD-10-PCS | Mod: 26,RT,, | Performed by: RADIOLOGY

## 2023-07-10 PROCEDURE — 77061 BREAST TOMOSYNTHESIS UNI: CPT | Mod: TC,RT

## 2023-07-10 PROCEDURE — 76642 ULTRASOUND BREAST LIMITED: CPT | Mod: TC,RT

## 2023-07-10 PROCEDURE — 76642 ULTRASOUND BREAST LIMITED: CPT | Mod: 26,RT,, | Performed by: RADIOLOGY

## 2023-07-10 PROCEDURE — 77061 MAMMO DIGITAL DIAGNOSTIC RIGHT WITH TOMO: ICD-10-PCS | Mod: 26,RT,, | Performed by: RADIOLOGY

## 2023-07-10 PROCEDURE — 77061 BREAST TOMOSYNTHESIS UNI: CPT | Mod: 26,RT,, | Performed by: RADIOLOGY

## 2023-07-10 PROCEDURE — 77065 DX MAMMO INCL CAD UNI: CPT | Mod: 26,RT,, | Performed by: RADIOLOGY

## 2023-07-14 ENCOUNTER — OFFICE VISIT (OUTPATIENT)
Dept: HEMATOLOGY/ONCOLOGY | Facility: CLINIC | Age: 73
End: 2023-07-14
Payer: MEDICARE

## 2023-07-14 VITALS — WEIGHT: 150.38 LBS | HEIGHT: 63 IN | BODY MASS INDEX: 26.64 KG/M2

## 2023-07-14 DIAGNOSIS — C50.812 MALIGNANT NEOPLASM OF OVERLAPPING SITES OF LEFT BREAST IN FEMALE, ESTROGEN RECEPTOR NEGATIVE: Primary | ICD-10-CM

## 2023-07-14 DIAGNOSIS — Z17.1 MALIGNANT NEOPLASM OF OVERLAPPING SITES OF LEFT BREAST IN FEMALE, ESTROGEN RECEPTOR NEGATIVE: Primary | ICD-10-CM

## 2023-07-14 PROCEDURE — 4010F ACE/ARB THERAPY RXD/TAKEN: CPT | Mod: CPTII,95,,

## 2023-07-14 PROCEDURE — 4010F PR ACE/ARB THEARPY RXD/TAKEN: ICD-10-PCS | Mod: CPTII,95,,

## 2023-07-14 PROCEDURE — 3008F BODY MASS INDEX DOCD: CPT | Mod: CPTII,95,,

## 2023-07-14 PROCEDURE — 3008F PR BODY MASS INDEX (BMI) DOCUMENTED: ICD-10-PCS | Mod: CPTII,95,,

## 2023-07-14 PROCEDURE — 99442 PR PHYSICIAN TELEPHONE EVALUATION 11-20 MIN: CPT | Mod: 95,,,

## 2023-07-14 PROCEDURE — 1159F MED LIST DOCD IN RCRD: CPT | Mod: CPTII,95,,

## 2023-07-14 PROCEDURE — 99442 PR PHYSICIAN TELEPHONE EVALUATION 11-20 MIN: ICD-10-PCS | Mod: 95,,,

## 2023-07-14 PROCEDURE — 1159F PR MEDICATION LIST DOCUMENTED IN MEDICAL RECORD: ICD-10-PCS | Mod: CPTII,95,,

## 2023-07-14 RX ORDER — CETIRIZINE HYDROCHLORIDE 10 MG/1
10 TABLET ORAL DAILY
COMMUNITY

## 2023-07-14 NOTE — PROGRESS NOTES
Established Patient - Audio Only Telehealth Visit     The patient location is: [place]  The chief complaint leading to consultation is: Breast cancer  Visit type: Virtual visit with audio only (telephone)  Total time spent with patient: 12 minutes       The reason for the audio only service rather than synchronous audio and video virtual visit was related to technical difficulties or patient preference/necessity.     Each patient to whom I provide medical services by telemedicine is:  (1) informed of the relationship between the physician and patient and the respective role of any other health care provider with respect to management of the patient; and (2) notified that they may decline to receive medical services by telemedicine and may withdraw from such care at any time. Patient verbally consented to receive this service via voice-only telephone call.    History of Present Illness  Consulting physician: Dr. Kira Melo    Chief complaint: anatomic stage IIA / prognostic stage IIB (cT2 cN0 M0 ) multifocal Left invasive ductal carcinoma grade 3 ER 0%, WV 0%, HER2 1+ - negative on IHC .  Atypical right 4th rib lesion, possibly M1 disease    Onc Hx:  10/19/20-1/7/21: weekly taxol X12    HPI: The patient presented for routine screening mammogram on June 8, 2020. That mammogram showed a developing asymmetry at 6:00 in the left breast. On 6/18 she underwent an ultrasound and bilateral diagnostic mammograms on June 18 2020. The ultrasound showed 3 oval hypoechoic masses the largest of which was 11 mm in the left breast. Benign appearing lymph nodes in the left axilla. She went back and had an ultrasound-guided biopsy on June 23, 2020 of 2 areas : one of the 2 cm mass in the left breast and the 1.1 cm mass in the left breast. Surgical pathology of both biopsies showed invasive ductal carcinoma both of which were ER WV negative, HER-2/haritha negative. She was seen and evaluated by Dr. Melo on 7/7/2020. She was then referred  to [facility] for further eval    She had PET CT 7/2020 that showed left breast lesion, b/l benign appearing LNs and a right 4th rib sclerotic lesion 10x5mm SUV 4.36    There was significant delay in treatment initiation due to transferring care to [facility]. Due to this, a repeat PET CT and repeat MRI were obtained. No significant changes were noted other than SUV of right sclerotic 4th rib lesion had decreased to 1.62    Decision made to proceed with NACT for stage II disease. Due to her multiple medical comorbidities and overall performance status she was given weekly taxol alone which was started on 10/19/20 and completed on 1/7/21    On 2/22/21 she underwent a left simple mastectomy and SLNE showing ypT1a ypN0 G3 IDC, residual focus of only 3mm, with extensive chemotherapeutic effect.    PMHx: schizophrenia, osteoporosis, HTN, HLD, IBS, DJD,asthma, urinary incontinence, diverticulosis  PSHx: back surgery, tonsillectomy, bladder surgery, D&C x2, sinus surgery, right knee surgery  Social hx: never smoker, no drugs, no ETOH  Family Hx: maternal aunt: pancreatic cancer, brother: colon cancer  Obgyn hx: Menarche at 11, menopause at 47, no history of hormone replacement therapy no previous biopsies, first birth 25  Meds: reviewed    Interval History:  Today 07/14/2023 : Patient via telephone for follow-up visit with nurse, Gabbi,  at bedside. Patient had a right breast screening MMG on 6/27/23 with asymmetry noted in central portion. She then completed diagnostic MMG and US of right breast 4 weeks later. Today, she is doing well overall. She is staying in a nursing home. Denies any breast complaints. Denies any dysuria or abdominal pain. She denies fevers, chills, shortness of breath, chest pain, N/V/C/D, melena, hematochezia, weight loss, bony pains, excessive fatigue, she has right shoulder, arm, hand pain that she follows with her PCP     Labs:  02/09/23 cr 0.7, wbc 5.6, hgb 10.7, plt 249   11/8/21 Cr 0.8 K 3 Alb 3.2WBC  6.2 Hg 11.9   21 WBC 5.8, Hgb 12.1, , ANC 2.75, Cr 0.7, Alb 3.2, AlkPhos 87, K 3.1  21 WBC 8.71, Hgb 10.0, , ANC 5.75, Cr 0.62, Alb 3.3, AlkPhos 79, K 2.8  21 WBC 4.8 Hg 9.6  K 3 Alb 2.9 AlkPhos 73 Cr 0.9  20 , K 3.0, CR 0.71, TP 6.6, ALB 3.5, WBC 7.71, Hg 10.1, , ANC 5.15  20 K 3.2, Cr 0.70, TP 6.3, ALB 3.3, WBC 3.99, Hg 9.9, , ANC 2.56  20 , , Cr 0.67, TP 6.6, ALB 3.5, WBC 5.31, Hgb 10.1, , ANC 3.44  20 WBC 4.53 Hg11.2  ANC 2.53  20 K 3.4, glucose 159, Cr 0.63, TP 7.1, ALB 3.5, WBC 3.13, Hg 10.6, , ANC 1.88  20 Cr 0.7 K 3.2 Alb 3.6 AST 25 ALT 26 WBC 2.73 Hg 11.4  ANC 1.63  10/19/20 Cr 0.72, K 3.3, TP 7.6, alb 3.7, WBC 8.59, Hgb 12.9, , ANC 7.13    IMAGIN/10/23: Right breast US- RNOAL  07/10/23: Diagnostic right breast MMG- RONAL  2020 BL SC MG at [facility] - revealing an area of calcification sin the central right breast and a developing asymmetry in the left breast at 6:00 o'clock. BIRADS-0: additional imaging recommended    2020 BL DG MG and Left US at [facility] - revealing benign appearing scattered, punctate, calcifications in the central right breast, a 1.1 cm oval mas with indistinct margins at 6 o'clock 3 cm FN in the left breast, and a 2.0 cm oval mass with indistinct margins and associated calcifications at 630 o'clock 4 cm FN. US of the left breast revealed 3 oval hypoechoic masses at 6 o'clock 3-4 cm FN, largest measuring 1.1 cm x 0.7 cm x 0.9 cm and two adjacent masses measuring 0.5 cm and 0.3 cm. There was also a 2.0 cm x 1.4 cm x 1.1 cm oval hypoechoic mass with indistinct margins at 630 o'clock 4 cm FN with associated echogenic foci consistent with calcifications. BIRADS-4: suspicious and biopsy is recommended.    20 PET CT: Within the inferior portion of the left breast slightly right paramedian is an irregularly defined hypermetabolic mass which  measures 2.0 x 1.0 cm on axial image 61 series 4 with SUV of 3.67. There are bilateral axillary mildly enlarged but nonpathologic appearing lymph nodes with normal fatty hilum and none of these show hypermetabolism. There is also no evidence of hypermetabolic lymphadenopathy within the internal mammary chain, mediastinum or hilum.  Right anterior fourth rib hypermetabolic sclerosis measures 10 mm x 5 mm with SUV of 4.36 on image 58 series 4.    7/30/2020, MRI of the brain no intracranial findings    9/14/2020, MRI of the breast:  6/18/2022 MMG right breast BIRADS 2- Benign  LEFT BREAST:   In the 6:30 left breast, 4 cm from the nipple, there is a 21 x 21 x 19 mm (axial image 89/128) oval, heterogeneously enhancing mass with irregular margins. Signal void consistent with a biopsy clip is associated with this known malignancy.   In the 6:00 left breast, 3 to 4 cm from the nipple, there are 5 oval, enhancing masses with noncircumscribed margins. These masses are as follows:   - 11 x 9 x 11 mm (axial image 91/128); there is a signal void consistent with a biopsy clip associated with this known malignancy.  - 6 x 4 x 6 mm (axial image 91/128)  - 10 x 4 x 7 mm (axial image 91/128)  - 6 x 6 x 6 mm (axial image 91/128)  - 6 x 4 x 6 mm (axial image 93/128); this mass is the most posterior of the masses and is 9 mm anterior to the chest wall.  There is no skin thickening or nipple retraction. No axillary or internal mammary lymphadenopathy is identified.  OTHER: There is a 9 mm enhancing mass within the right anterior fourth rib. This corresponds to the hypermetabolic lesion seen on PET CT dated 7/30/2020. A trace right-sided pleural effusion is present.    10/14/20 PET/CT: Mild interval enlargement and increased FDG avidity of the left breast mass. More sclerotic appearance of the anterior right fourth rib lesion, with decrease hypermetabolic uptake, maximum SUV is 1.62    12/1/20 MUGA with LVEF 70%    2/1/21 MRI b/l breasts:  no residual abnormal enhancement is seen in the left breast at the sites of the known malignancy in the 6:00 and 6:30 left breast, indicating a favorable response to neoadjuvant chemotherapy. Enhancing mass within the right anterior fourth rib is less conspicuous on the current exam. While the mass is nonspecific, metastatic disease remains in the differential.    6/10/21 MG KWABENA RT Breast: Diffuse calcifications in the right breast are without significant change. RONAL noted. F/U screening X 1 year (6/2022).    PATHOLOGY:  1. 6/26/2020 US-Guided Core Biopsy Left Breast 2 cm Mass 630 o'clock 4 cm FN - INVASIVE DUCTAL CARCINOMA, GRADE 3 WITH MICROCALCIFICATIONS  ER 0.00%, MS 0.00%, HER2 1+ on IHC/negative.  2. 6/26/2020 US-Guided Core Biopsy Left breast 1.1 cm Mass 6 o'clock 3-4 cm FN - INVASIVE DUCTAL CARCINOMA, GRADE 3 WITH MICROCALCIFICATIONS  ER 0.00%, MS 0.00%, HER2 1+ on IHC/negative.    2/22/21 left simple mastectomy: residual IDC G3, 3mm, 6 oclock position. Margins neg, closest is inferior/anterior at 10mm. No LVI. 2 axillary SLN negative. Positive response to NACT. ypT1a ypN0    Review of Systems  CONSTITUTIONAL: no fevers, no chills, no weight loss, no fatigue, no weakness  HEMATOLOGIC: no abnormal bleeding, no abnormal bruising, no drenching night sweats  ONCOLOGIC: no new masses or lumps  HEENT: no vision loss, no tinnitus or hearing loss, no nose bleeding, no dysphagia, no odynophagia  CVS: no chest pain, no palpitations, no dyspnea on exertion  RESP: no shortness of breath, no hemoptysis, no cough  BREAST: no nipple discharge, no breast tenderness, no breast masses on self breast examination  GI: no nausea, no vomiting, no diarrhea, no constipation, no melena, no hematochezia, no hematemesis, no abdominal pain, no increase in abdominal girth  : no dysuria, no hematuria, no discharge  GYN: +abnormal vaginal bleeding, no dyspareunia, no vaginal discharge  INTEGUMENT: no rashes, no abnormal bruising, no  nail pitting, no hyperpigmentation  NEURO: no falls, no memory loss, no paresthesias or dysesthesias, no urofecal incontinence or retention, no loss of strength on any extremity  MSK: no back pain, +pain in right shoulder, no joint swelling  PSYCH: no suicidal or homicidal ideation, no depression, no insomnia, no anhedonia  ENDOCRINE: no heat or cold intolerance, no polyuria, no polydipsia    Physical Exam  There were no vitals filed for this visit.    ECOG PS 2  GA: AAOx3, NAD  HEENT: NCAT, PERRLA, EOMI, fair dentition, no oral ulcers  LYMPH: no cervical, axillary or supraclavicular adenopathy  CVS: s1s2 RRR, no M/R/G  RESP: CTA b/l, no crackles, no wheezes or rhonchi  Breast: Left: s/p mastectomy, Right: no masses, lumps, no nipple inversion, no peau d'orange  ABD: soft, NT, ND, BS+, no hepatosplenomegaly  EXT: no deformities, + 2 BLE edema, no pedal edema  SKIN: no rashes, no bruises or purpura, warm and dry  NEURO: normal mentation, strength 5/5 on all 4 extremities, no sensory deficits    Assessment/Plan   1. Cancer of overlapping sites of left female breast C50.812  Multifocal left breast IDC G3 TN T2N0 w/ suspicious right lytic rib lesion  10/14/20 PET/CT: Mild interval enlargement and increased FDG avidity of the left breast mass. More sclerotic appearance of the anterior right fourth rib lesion, with decreased hypermetabolic uptake, unchanged in comparison.  Mediport placed on 10/12/20 with Dr. Lorie RIVAS 12/1/20 with LVEF 70%  Completed 12 weeks of taxol  Post treatment MRI b/l breasts 2/1/21 shows complete response. Enhancing mass within the right anterior fourth rib is less conspicuous. While the mass is nonspecific, metastatic disease remains in the differential.  L simple mastectomy and SLNE with ypT1a ypN0 with extensive pathologic response and only minimal residual IDC of 3mm  She was referred to [facility], RT not recommended due to multiple comorbidities.  MD discussed possibility of adjuvant Xeloda.  He was not sure how well she could tolerate it but we did offer it. She did not wish to try Xeloda and opted to proceed with surveillance monitoring.  MMG of right breast 6/21 normal. Repeat MMG Right breast due 1 year (6/2022), already ordered per surgery  Triple neg breast cancer, has still not done genetic counseling, (pt declines at this time)  C/w port flush    RTC in 6 months No labs needed  Right breast MMG due 07/11/23. Order next visit.  Continue f/u with Dr. Melo office   Continue with Port flush  Call if any questions or concerns                           This service was not originating from a related E/M service provided within the previous 7 days nor will  to an E/M service or procedure within the next 24 hours or my soonest available appointment.  Prevailing standard of care was able to be met in this audio-only visit.

## 2023-08-09 ENCOUNTER — OFFICE VISIT (OUTPATIENT)
Dept: SURGERY | Facility: CLINIC | Age: 73
End: 2023-08-09
Payer: MEDICARE

## 2023-08-09 ENCOUNTER — TELEPHONE (OUTPATIENT)
Dept: SURGERY | Facility: CLINIC | Age: 73
End: 2023-08-09

## 2023-08-09 VITALS
HEIGHT: 63 IN | DIASTOLIC BLOOD PRESSURE: 89 MMHG | RESPIRATION RATE: 18 BRPM | SYSTOLIC BLOOD PRESSURE: 146 MMHG | BODY MASS INDEX: 27.11 KG/M2 | WEIGHT: 153 LBS | OXYGEN SATURATION: 99 % | TEMPERATURE: 98 F | HEART RATE: 69 BPM

## 2023-08-09 DIAGNOSIS — Z90.12 HISTORY OF LEFT MASTECTOMY: ICD-10-CM

## 2023-08-09 DIAGNOSIS — Z85.3 ENCOUNTER FOR FOLLOW-UP SURVEILLANCE OF BREAST CANCER: ICD-10-CM

## 2023-08-09 DIAGNOSIS — Z12.31 SCREENING MAMMOGRAM, ENCOUNTER FOR: Primary | ICD-10-CM

## 2023-08-09 DIAGNOSIS — Z08 ENCOUNTER FOR FOLLOW-UP SURVEILLANCE OF BREAST CANCER: ICD-10-CM

## 2023-08-09 DIAGNOSIS — Z85.3 PERSONAL HISTORY OF BREAST CANCER: ICD-10-CM

## 2023-08-09 DIAGNOSIS — E27.9 LESION OF ADRENAL GLAND: ICD-10-CM

## 2023-08-09 DIAGNOSIS — K76.9 LESION OF LIVER: ICD-10-CM

## 2023-08-09 PROCEDURE — 3079F DIAST BP 80-89 MM HG: CPT | Mod: CPTII,S$GLB,, | Performed by: PHYSICIAN ASSISTANT

## 2023-08-09 PROCEDURE — 3077F SYST BP >= 140 MM HG: CPT | Mod: CPTII,S$GLB,, | Performed by: PHYSICIAN ASSISTANT

## 2023-08-09 PROCEDURE — 1159F PR MEDICATION LIST DOCUMENTED IN MEDICAL RECORD: ICD-10-PCS | Mod: CPTII,S$GLB,, | Performed by: PHYSICIAN ASSISTANT

## 2023-08-09 PROCEDURE — 1160F RVW MEDS BY RX/DR IN RCRD: CPT | Mod: CPTII,S$GLB,, | Performed by: PHYSICIAN ASSISTANT

## 2023-08-09 PROCEDURE — 3077F PR MOST RECENT SYSTOLIC BLOOD PRESSURE >= 140 MM HG: ICD-10-PCS | Mod: CPTII,S$GLB,, | Performed by: PHYSICIAN ASSISTANT

## 2023-08-09 PROCEDURE — 99999 PR PBB SHADOW E&M-EST. PATIENT-LVL V: CPT | Mod: PBBFAC,,, | Performed by: PHYSICIAN ASSISTANT

## 2023-08-09 PROCEDURE — 3079F PR MOST RECENT DIASTOLIC BLOOD PRESSURE 80-89 MM HG: ICD-10-PCS | Mod: CPTII,S$GLB,, | Performed by: PHYSICIAN ASSISTANT

## 2023-08-09 PROCEDURE — 1126F PR PAIN SEVERITY QUANTIFIED, NO PAIN PRESENT: ICD-10-PCS | Mod: CPTII,S$GLB,, | Performed by: PHYSICIAN ASSISTANT

## 2023-08-09 PROCEDURE — 3008F PR BODY MASS INDEX (BMI) DOCUMENTED: ICD-10-PCS | Mod: CPTII,S$GLB,, | Performed by: PHYSICIAN ASSISTANT

## 2023-08-09 PROCEDURE — 99215 PR OFFICE/OUTPT VISIT, EST, LEVL V, 40-54 MIN: ICD-10-PCS | Mod: S$GLB,,, | Performed by: PHYSICIAN ASSISTANT

## 2023-08-09 PROCEDURE — 99215 OFFICE O/P EST HI 40 MIN: CPT | Mod: S$GLB,,, | Performed by: PHYSICIAN ASSISTANT

## 2023-08-09 PROCEDURE — 3008F BODY MASS INDEX DOCD: CPT | Mod: CPTII,S$GLB,, | Performed by: PHYSICIAN ASSISTANT

## 2023-08-09 PROCEDURE — 1159F MED LIST DOCD IN RCRD: CPT | Mod: CPTII,S$GLB,, | Performed by: PHYSICIAN ASSISTANT

## 2023-08-09 PROCEDURE — 1126F AMNT PAIN NOTED NONE PRSNT: CPT | Mod: CPTII,S$GLB,, | Performed by: PHYSICIAN ASSISTANT

## 2023-08-09 PROCEDURE — 99999 PR PBB SHADOW E&M-EST. PATIENT-LVL V: ICD-10-PCS | Mod: PBBFAC,,, | Performed by: PHYSICIAN ASSISTANT

## 2023-08-09 PROCEDURE — 4010F ACE/ARB THERAPY RXD/TAKEN: CPT | Mod: CPTII,S$GLB,, | Performed by: PHYSICIAN ASSISTANT

## 2023-08-09 PROCEDURE — 1160F PR REVIEW ALL MEDS BY PRESCRIBER/CLIN PHARMACIST DOCUMENTED: ICD-10-PCS | Mod: CPTII,S$GLB,, | Performed by: PHYSICIAN ASSISTANT

## 2023-08-09 PROCEDURE — 4010F PR ACE/ARB THEARPY RXD/TAKEN: ICD-10-PCS | Mod: CPTII,S$GLB,, | Performed by: PHYSICIAN ASSISTANT

## 2023-08-09 NOTE — PROGRESS NOTES
[facility] Breast Surg  Breast Surgical Oncology  Follow-Up Patient Office Visit       PCP: Alejandro Forbes MD   Care Team:   Medical Oncologist: Wiley Solano MD     Chief Complaint:   Chief Complaint   Patient presents with    Follow-up     Patient states no breast complaints, patient would like to remove her mediport        Subjective:   Treatment History:  1. Right subclavian Mediport placement 10/12/2020  2. Neoadjuvant chemotherapy weekly Taxol x 12 cycles 10/19/2020 - 1/7/2021  3. Left Simple Mastectomy with SLNB 2/22/2021  4. She was referred to [facility], RT not recommended due to multiple comorbidities.  5. Xeloda offered for adjuvant treatment, declined.  6. Genetic counseling referral declined.    Interval History:  8/9/2023 - Nancy Parekh is doing well and is here for delayed follow up. She reports no breast or mastectomy issues today. She would like her port removed. She also has moved to a Nursing Home in [place]. She states that she was diagnosed with dementia (only mild symptoms at this time per patient and nursing home staff). She had a follow up CT to evaluate adrenal and liver lesions in September of last year, which was deemed probably benign with follow up CT recommended. She states that despite recent dementia diagnosis, she would prefer to follow area to ensure it is not cancer.    HPI:  Nancy Parekh initially presented on 10/7/2020 with AJCC 8th ed clinical anatomic stage IIA / prognostic stage IIB (cT2 cN0 M0) multifocal Left invasive ductal carcinoma grade 3 ER 0%, IA 0%, HER2 1+ - negative on IHC, possible Stage IV with right anterior rib lesion. She has completed neoadjuvant weekly Taxol x 12 cycles (10/19/2020 - 1/7/2021). She is SP left simple mastectomy with SLNB on 2/22/2021. Final pathology revealed AJCC 8th ed pathological stage (pT1a pN0 M1?) left breast residual focus of invasive ductal carcinoma, grade 3 with extensive  chemotherapeutic effect, measuring 3 mm at 6 o'clock, and closed margin 1.0 cm. Biopsy site changes at 6:30 o'clock with no residual carcinoma identified. Two sentinel lymph nodes negative for metastatic carcinoma.    At follow up visit in 2022, while I was reviewing prior imaging,we were made aware of a CT scans performed performed at [facility] in 3/2021 (previously these scans were unknown to us and only visible now due to EMR merge with [facility]). CT Chest noted two well-circumscribed hepatic hypodensities possibly representing cysts and an additional questionable 1.5 cm left adrenal nodule in the partially visualized upper abdomen. There were no comparison images available for this scan and correlation was recommended. If no correlate seen, CT Abdomen was recommended. She denies any abdominal symptoms including changes in bowel movements, abdominal pain, or urinary symptoms. Spoke with Dr. Matt Gomes who reviewed the 3/2021 PET CT study and the [facility] PET images from 10/14/2020. Liver lesions or left adrenal nodule are not clearly visualized on the PET scan. This could mean they are new since , or it could be due to the low dose noncontrast CT protocol used for the PET studies.      PLAN: Recommend proceeding with the dedicated abdominal CT and specify adrenal protocol (will let us measure washout values, as well as provide multiphase eval of the liver findings).     Imagin. 2020 BL SC MG at [facility] - revealing an area of calcifications in the central right breast and a developing asymmetry in the left breast at 6:00 o'clock. BIRADS-0: additional imaging recommended    2. 2020 BL DG MG and Left US at [facility] - revealing benign appearing scattered, punctate, calcifications in the central right breast, a 1.1 cm oval mas with indistinct margins at 6 o'clock 3 cm FN in the left breast, and a 2.0 cm oval mass with indistinct margins and associated calcifications at 630 o'clock 4 cm FN. US of  the left breast revealed 3 oval hypoechoic masses at 6 o'clock 3-4 cm FN, largest measuring 1.1 cm x 0.7 cm x 0.9 cm and two adjacent masses measuring 0.5 cm and 0.3 cm. There was also a 2.0 cm x 1.4 cm x 1.1 cm oval hypoechoic mass with indistinct margins at 630 o'clock 4 cm FN with associated echogenic foci consistent with calcifications. BIRADS-4: suspicious and biopsy is recommended.    3. 7/30/2020 PET CT at [facility] - which revealed inferior left breast hypermetabolic mass consistent with biopsy-proven carcinoma and right anterior fourth rib hypermetabolic sclerosis suspected to be metastatic    4. 7/30/2020 MRI Brain at [facility] - which revealed no acute intracranial findings or metastatic evidence and mild chronic microangiographic ischemia.    5. 9/14/2020 BL Breast MRI at [facility] - which revealed at 6:30 in the left breast 4m FN a 2.1 cm x 2.1 cm x 1.9 cm heterogeneously enhancing mass with irregular margins and signal void consistent with biopsy-proven malignancy is seen, at 6 o'clock 3-4 cm FN there is are 5 oval, enhancing masses with non-circumscribed margins: 1.1 cm x 0.9 cm x 1.1 cm (signal void consistent with a biopsy-proven malignancy),and four additional masses ranging in size from 0.6 cm to 1.0 cm. There is an 9 mm enhancing mass within the right anterior fourth rib corresponding to spot seen on PET CT and trace right-sided pleural effusion.    6. 10/14/2020 PET CT at [facility] -which revealed mild interval enlargement and increased FDG avidity the left breast mass, anterior right 4th rib lesion appears more sclerotic in appearance compared to previous with decreased hypermetabolic uptake, and otherwise no evidence for metastatic disease    7. 2/1/2021 BL Breast MRI at [facility] - which revealed in the right breast no suspicious areas of enhancement or areas of architectural distortion, and the left breast 2 clips denote sites of known malignancy at 6:00 and 630 the previously described enhancing mass is no longer  enhancing on this current MRI, no skin thickening or nipple retraction is noted, and no axillary or internal mammary lymph nodes are present. The enhancing mass that was within the right anterior fourth rib is less conspicuous on current exam. While the mass is nonspecific cannot exclude metastatic disease.    8. 3/21/2021 CT Head W/o Contrast (due to head trauma) at [facility] ED - No obvious acute abnormality.    9. 3/21/2021 CT CHEST (PE Study) at [facility] ED - 1.  Findings compatible with pulmonary thromboembolism involving the distal right and left pulmonary arteries extending into the segmental branches of the lingula, left lower lobe, right upper lobe, and right lower lobe.  There is mild right-sided heart prominence and component of right-sided heart strain is not excluded on the basis of this study.  2.  Postsurgical change of left-sided mastectomy with surgical drainage catheter in place.  There is mild induration of the subcutaneous soft tissues, presumably postoperative in etiology without discrete drainable fluid collection of the left chest wall.  3.  Mildly patulous esophagus with high density material layering in the midthoracic esophagus which may relate to recently administered/ingested medication.  Clinical correlation is recommended.  4.  Two well-circumscribed hepatic hypodensities possibly representing cysts.  Additional questionable 1.5 cm left adrenal nodule in the partially visualized upper abdomen.  Correlation with any prior abdominal preoperative imaging advised in this patient with history of breast malignancy.  Nonemergent dedicated abdominal CT follow-up could be performed for further characterization if not previously done.    10. 6/10/2021 R SCR MG at [facility] - BIRADS 2: BENIGN: A subcutaneous infusion port partially obscures evaluation of the right axilla.   Diffuse calcifications in the right breast are without signficant change.   No new concerning masses, calcifications, or other findings are  seen in the right breast.  There has been no significant interval change.    10. 6/18/2022 R SCR MG at [facility] - BIRADS 2: BENIGN: A port-a-catheter partially obscures evaluation of the right axilla. There are morphologically similar calcifications diffusely distributed throughout the right breast, with slow increase in their number over the years. These are considered benign.  No significant masses, calcifications, or other findings are seen in the breast.  There has been no significant interval change.     11.  CT Abdomen w/ w/o contrast at [facility] 9/12/2022      File Link    Scan on 8/7/2023  3:25 PM by Sim Gaxiola MT: CT Abdomen/Pelvis w/wo [facility],09/12/2022        Key Information    Document ID File Type Document Type Description   [id number] Image Outside Radiology Documentation CT Abdomen/Pelvis w/wo [facility],09/12/2022     Import Information    Attached At Date Time User Dept   Patient Level 8/7/2023  3:25 PM Sim Gaxiola MT [facility] Breast Surgery       Pathology:  1. 6/26/2020 US-Guided Core Biopsy Left Breast 2 cm Mass 630 o'clock 4 cm FN - INVASIVE DUCTAL CARCINOMA, GRADE 3 WITH MICROCALCIFICATIONS  ER 0.00%, SD 0/00%, HER2 1+ on IHC/negative.    2. 6/26/2020 US-Guided Core Biopsy Left breast 1.1 cm Mass 6 o'clock 3-4 cm FN - INVASIVE DUCTAL CARCINOMA, GRADE 3 WITH MICROCALCIFICATIONS  ER 0.00%, SD 0/00%, HER2 1+ on IHC/negative.    OB/GYN History:  Menarche Onset: 11  Menopause: Post, at age: 42  Hormonal birth control (duration): yes, 20years started at age 26  Pregnancies: 1  Age at first pregnancy: 25  Child births: 1  Breastfeeding duration: no   Hysterectomy: no  Oophorectomy: no  HRT: no    Other:  # of breast biopsies (when and pathology results): none  MG breast density: Category B (Scattered fibroglandular)  Prior thoracic RT: none  Genetic testing: none  Ashkenazi Roman Catholic descent: No    Family  History:  Family History   Problem Relation Age of Onset    Colon cancer Brother     Pancreatic cancer Other         Patient History:  Past Medical History:   Diagnosis Date    Alzheimer's disease, unspecified (CODE)     Anemia     Arthritis     Breast cancer     Dementia associated with alcoholism, without behavioral disturbance, psychotic disturbance, mood disturbance, or anxiety, unspecified dementia severity     Disorder of adrenal gland, unspecified     GERD (gastroesophageal reflux disease)     HLD (hyperlipidemia)     HTN (hypertension)     Irritable bowel disease     Malignant neoplasm of overlapping sites of left female breast     Mixed hyperlipidemia     Osteoporosis     Pulmonary embolism     Schizophrenia        Past Surgical History:   Procedure Laterality Date    MASTECTOMY  02/2021       Social History     Socioeconomic History    Marital status:    Tobacco Use    Smoking status: Never    Smokeless tobacco: Never   Substance and Sexual Activity    Alcohol use: Not Currently    Drug use: Never       Immunization History   Administered Date(s) Administered    COVID-19, MRNA, LN-S, PF (Pfizer) (Purple Cap) 02/19/2021, 03/10/2021, 11/05/2021       Medications/Allergies:  Current Outpatient Medications on File Prior to Visit   Medication Sig Dispense Refill    apixaban (ELIQUIS) 5 mg Tab Take 1 tablet (5 mg total) by mouth 2 (two) times daily. 60 tablet 1    atorvastatin (LIPITOR) 40 MG tablet Take 40 mg by mouth every evening.      benztropine (COGENTIN) 1 MG tablet Take 1 mg by mouth every morning.      cetirizine (ZYRTEC) 10 MG tablet Take 10 mg by mouth once daily.      fluticasone furoate (ARNUITY ELLIPTA) 100 mcg/actuation inhaler Inhale 1 puff into the lungs once daily.       fluticasone propionate (FLONASE) 50 mcg/actuation nasal spray 2 sprays by Each Nostril route once daily.      haloperidoL (HALDOL) 5 MG tablet Take 5 mg by mouth 2 (two) times daily.      losartan (COZAAR) 50 MG tablet  Take 25 mg by mouth once daily.      memantine (NAMENDA) 5 MG Tab Take 5 mg by mouth every evening.      metoprolol tartrate (LOPRESSOR) 25 MG tablet Take 1 tablet (25 mg total) by mouth 2 (two) times daily. 60 tablet 1    polyethylene glycol (GLYCOLAX) 17 gram PwPk Take 17 g by mouth once daily. 30 each 1     No current facility-administered medications on file prior to visit.       Review of patient's allergies indicates:   Allergen Reactions    Iodine Other (See Comments)     Other reaction(s): Unknown       Review of Systems:  A comprehensive review of systems was negative. (other than +left hip pain (chronic))     Objective:     Vitals:  Vitals:    08/09/23 0936   BP: (!) 146/89   Pulse: 69   Resp: 18   Temp: 98 °F (36.7 °C)         Body mass index is 27.1 kg/m².     Physical Exam:  General: The patient is awake, alert and oriented times three. The patient is well nourished and in no acute distress.  Neck: There is no evidence of palpable cervical, supraclavicular or axillary adenopathy. The neck is supple. The thyroid is not enlarged.  Musculoskeletal: The patient has a normal range of motion of her bilateral upper extremities.  Chest: Examination of the chest wall fails to reveal any obvious abnormalities. Nonlabored breathing, symmetric expansion.  Breast:  Right: Examination of right breast fails to reveal any dominant masses or areas of significant focal nodularity. The nipple is everted without evidence of discharge. There is no skin dimpling with movement of the pectoralis. There are no significant skin changes overlying the breast.   Left: Examination of left mastectomy site fails to reveal any dominant masses or areas of significant focal nodularity. The nipple is surgically absent. There are no significant skin changes overlying the breasts. There is no skin dimpling with movement of the pectoralis.  Abdomen: The abdomen is soft, flat, nontender and nondistended.  Integumentary: no rashes or skin  lesions present  Neurologic: cranial nerves intact, no signs of peripheral neurological deficit, motor/sensory function intact      Assessment and Plan:       Nancy was seen today for follow-up.    Diagnoses and all orders for this visit:    Screening mammogram, encounter for  -     Mammo Digital Screening Right with [facility]; Future    Encounter for follow-up surveillance of breast cancer    History of left mastectomy    Lesion of adrenal gland  -     CT Abdomen Pelvis W Wo Contrast; Future  -     Creatinine, serum; Future  -     CT Abdomen Pelvis W Wo Contrast    Lesion of liver  -     CT Abdomen Pelvis W Wo Contrast; Future  -     Creatinine, serum; Future  -     CT Abdomen Pelvis W Wo Contrast    Personal history of breast cancer          Plan:     Recommend repeat CT abdomen, patient requests to be done at [facility].     A routine R screening mammogram in one year in the absence of significant clinical findings in the interval is recommended (July 2024).    Continue f/u with PCP and medical oncology.    She >2 years s/p surgery and is therefore recommended to transition follow up to annual visits with our office. RTC in 1 year. Will be monitoring results of CT scan in the interval.    CC: Dr. Solano    All of her questions were answered. She was advised to call if she develops any questions or concerns.    Zarina Francois PA-C           --------------------------------------------------------------------------------------------------------------  Total time on the date of the visit ranged from 40-54 mins ([id number]). Total time includes both face-to-face and non-face-to-face time personally spent by myself on the day of the visit.    Non-face-to-face time included:  _X_ preparing to see the patient such as reviewing the patient record  __ obtaining and reviewing separately obtained history  _X_ independently interpreting results  _X_ documenting clinical information in electronic health  record.    Face-to-face time included:  _X_ performing an appropriate history and examination  _X_ communicating results to the patient  _X_ counseling and educating the patient  __ ordering appropriate medications  _x_ ordering appropriate tests  _X_ ordering appropriate procedures (including follow-up)  _X_ answering any questions the patient had    Total Time spent on date of visit: 45 minutes

## 2023-08-09 NOTE — PROGRESS NOTES
[facility] Breast Surg  Breast Surgical Oncology  Follow-Up Patient Office Visit       PCP: Alejandro Forbes MD   Care Team:   Medical Oncologist: Wiley Solano MD     Chief Complaint:   Chief Complaint   Patient presents with    Follow-up     Patient states no breast complaints, patient would like to remove her mediport        Subjective:   Treatment History:  1. Right subclavian Mediport placement 10/12/2020  2. Neoadjuvant chemotherapy weekly Taxol x 12 cycles 10/19/2020 - 1/7/2021  3. Left Simple Mastectomy with SLNB 2/22/2021  4. She was referred to [facility], RT not recommended due to multiple comorbidities.  5. Xeloda offered for adjuvant treatment, declined.  6. Genetic counseling referral declined.    Interval History:  8/9/2023 - Nancy Parekh is doing well and is here for delayed follow up. She reports no breast or mastectomy issues today. She would like her port removed. She also has moved to a Nursing Home in [place]. She states that she was diagnosed with dementia (only mild symptoms at this time per patient and nursing home staff). She had a follow up CT to evaluate adrenal and liver lesions in September of last year, which was deemed probably benign with follow up CT recommended. She states that despite recent dementia diagnosis, she would prefer to follow area to ensure it is not cancer.    HPI:  Nancy Parekh initially presented on 10/7/2020 with AJCC 8th ed clinical anatomic stage IIA / prognostic stage IIB (cT2 cN0 M0) multifocal Left invasive ductal carcinoma grade 3 ER 0%, VT 0%, HER2 1+ - negative on IHC, possible Stage IV with right anterior rib lesion. She has completed neoadjuvant weekly Taxol x 12 cycles (10/19/2020 - 1/7/2021). She is SP left simple mastectomy with SLNB on 2/22/2021. Final pathology revealed AJCC 8th ed pathological stage (pT1a pN0 M1?) left breast residual focus of invasive ductal carcinoma, grade 3 with extensive  chemotherapeutic effect, measuring 3 mm at 6 o'clock, and closed margin 1.0 cm. Biopsy site changes at 6:30 o'clock with no residual carcinoma identified. Two sentinel lymph nodes negative for metastatic carcinoma.    At follow up visit in 2022, while I was reviewing prior imaging,we were made aware of a CT scans performed performed at [facility] in 3/2021 (previously these scans were unknown to us and only visible now due to EMR merge with [facility]). CT Chest noted two well-circumscribed hepatic hypodensities possibly representing cysts and an additional questionable 1.5 cm left adrenal nodule in the partially visualized upper abdomen. There were no comparison images available for this scan and correlation was recommended. If no correlate seen, CT Abdomen was recommended. She denies any abdominal symptoms including changes in bowel movements, abdominal pain, or urinary symptoms. Spoke with Dr. Matt Gomes who reviewed the 3/2021 PET CT study and the [facility] PET images from 10/14/2020. Liver lesions or left adrenal nodule are not clearly visualized on the PET scan. This could mean they are new since , or it could be due to the low dose noncontrast CT protocol used for the PET studies.      PLAN: Recommend proceeding with the dedicated abdominal CT and specify adrenal protocol (will let us measure washout values, as well as provide multiphase eval of the liver findings).     Imagin. 2020 BL SC MG at [facility] - revealing an area of calcifications in the central right breast and a developing asymmetry in the left breast at 6:00 o'clock. BIRADS-0: additional imaging recommended    2. 2020 BL DG MG and Left US at [facility] - revealing benign appearing scattered, punctate, calcifications in the central right breast, a 1.1 cm oval mas with indistinct margins at 6 o'clock 3 cm FN in the left breast, and a 2.0 cm oval mass with indistinct margins and associated calcifications at 630 o'clock 4 cm FN. US of  the left breast revealed 3 oval hypoechoic masses at 6 o'clock 3-4 cm FN, largest measuring 1.1 cm x 0.7 cm x 0.9 cm and two adjacent masses measuring 0.5 cm and 0.3 cm. There was also a 2.0 cm x 1.4 cm x 1.1 cm oval hypoechoic mass with indistinct margins at 630 o'clock 4 cm FN with associated echogenic foci consistent with calcifications. BIRADS-4: suspicious and biopsy is recommended.    3. 7/30/2020 PET CT at [facility] - which revealed inferior left breast hypermetabolic mass consistent with biopsy-proven carcinoma and right anterior fourth rib hypermetabolic sclerosis suspected to be metastatic    4. 7/30/2020 MRI Brain at [facility] - which revealed no acute intracranial findings or metastatic evidence and mild chronic microangiographic ischemia.    5. 9/14/2020 BL Breast MRI at [facility] - which revealed at 6:30 in the left breast 4m FN a 2.1 cm x 2.1 cm x 1.9 cm heterogeneously enhancing mass with irregular margins and signal void consistent with biopsy-proven malignancy is seen, at 6 o'clock 3-4 cm FN there is are 5 oval, enhancing masses with non-circumscribed margins: 1.1 cm x 0.9 cm x 1.1 cm (signal void consistent with a biopsy-proven malignancy),and four additional masses ranging in size from 0.6 cm to 1.0 cm. There is an 9 mm enhancing mass within the right anterior fourth rib corresponding to spot seen on PET CT and trace right-sided pleural effusion.    6. 10/14/2020 PET CT at [facility] -which revealed mild interval enlargement and increased FDG avidity the left breast mass, anterior right 4th rib lesion appears more sclerotic in appearance compared to previous with decreased hypermetabolic uptake, and otherwise no evidence for metastatic disease    7. 2/1/2021 BL Breast MRI at [facility] - which revealed in the right breast no suspicious areas of enhancement or areas of architectural distortion, and the left breast 2 clips denote sites of known malignancy at 6:00 and 630 the previously described enhancing mass is no longer  enhancing on this current MRI, no skin thickening or nipple retraction is noted, and no axillary or internal mammary lymph nodes are present. The enhancing mass that was within the right anterior fourth rib is less conspicuous on current exam. While the mass is nonspecific cannot exclude metastatic disease.    8. 3/21/2021 CT Head W/o Contrast (due to head trauma) at [facility] ED - No obvious acute abnormality.    9. 3/21/2021 CT CHEST (PE Study) at [facility] ED - 1.  Findings compatible with pulmonary thromboembolism involving the distal right and left pulmonary arteries extending into the segmental branches of the lingula, left lower lobe, right upper lobe, and right lower lobe.  There is mild right-sided heart prominence and component of right-sided heart strain is not excluded on the basis of this study.  2.  Postsurgical change of left-sided mastectomy with surgical drainage catheter in place.  There is mild induration of the subcutaneous soft tissues, presumably postoperative in etiology without discrete drainable fluid collection of the left chest wall.  3.  Mildly patulous esophagus with high density material layering in the midthoracic esophagus which may relate to recently administered/ingested medication.  Clinical correlation is recommended.  4.  Two well-circumscribed hepatic hypodensities possibly representing cysts.  Additional questionable 1.5 cm left adrenal nodule in the partially visualized upper abdomen.  Correlation with any prior abdominal preoperative imaging advised in this patient with history of breast malignancy.  Nonemergent dedicated abdominal CT follow-up could be performed for further characterization if not previously done.    10. 6/10/2021 R SCR MG at [facility] - BIRADS 2: BENIGN: A subcutaneous infusion port partially obscures evaluation of the right axilla.   Diffuse calcifications in the right breast are without signficant change.   No new concerning masses, calcifications, or other findings are  seen in the right breast.  There has been no significant interval change.    10. 6/18/2022 R SCR MG at [facility] - BIRADS 2: BENIGN: A port-a-catheter partially obscures evaluation of the right axilla. There are morphologically similar calcifications diffusely distributed throughout the right breast, with slow increase in their number over the years. These are considered benign.  No significant masses, calcifications, or other findings are seen in the breast.  There has been no significant interval change.     11.  CT Abdomen w/ w/o contrast at [facility] 9/12/2022      File Link    Scan on 8/7/2023  3:25 PM by Sim Gaxiola MT: CT Abdomen/Pelvis w/wo [facility],09/12/2022        Key Information    Document ID File Type Document Type Description   [id number] Image Outside Radiology Documentation CT Abdomen/Pelvis w/wo [facility],09/12/2022     Import Information    Attached At Date Time User Dept   Patient Level 8/7/2023  3:25 PM Sim Gaxiola MT [facility] Breast Surgery       Pathology:  1. 6/26/2020 US-Guided Core Biopsy Left Breast 2 cm Mass 630 o'clock 4 cm FN - INVASIVE DUCTAL CARCINOMA, GRADE 3 WITH MICROCALCIFICATIONS  ER 0.00%, SD 0/00%, HER2 1+ on IHC/negative.    2. 6/26/2020 US-Guided Core Biopsy Left breast 1.1 cm Mass 6 o'clock 3-4 cm FN - INVASIVE DUCTAL CARCINOMA, GRADE 3 WITH MICROCALCIFICATIONS  ER 0.00%, SD 0/00%, HER2 1+ on IHC/negative.    OB/GYN History:  Menarche Onset: 11  Menopause: Post, at age: 42  Hormonal birth control (duration): yes, 20years started at age 26  Pregnancies: 1  Age at first pregnancy: 25  Child births: 1  Breastfeeding duration: no   Hysterectomy: no  Oophorectomy: no  HRT: no    Other:  # of breast biopsies (when and pathology results): none  MG breast density: Category B (Scattered fibroglandular)  Prior thoracic RT: none  Genetic testing: none  Ashkenazi Sabianism descent: No    Family  History:  Family History   Problem Relation Age of Onset    Colon cancer Brother     Pancreatic cancer Other         Patient History:  Past Medical History:   Diagnosis Date    Anemia     Arthritis     Breast cancer     HLD (hyperlipidemia)     HTN (hypertension)     Malignant neoplasm of overlapping sites of left female breast     Mixed hyperlipidemia     Osteoporosis     Pulmonary embolism     Schizophrenia        Past Surgical History:   Procedure Laterality Date    MASTECTOMY  02/2021       Social History     Socioeconomic History    Marital status:    Tobacco Use    Smoking status: Never    Smokeless tobacco: Never   Substance and Sexual Activity    Alcohol use: Not Currently    Drug use: Never       Immunization History   Administered Date(s) Administered    COVID-19, MRNA, LN-S, PF (Pfizer) (Purple Cap) 02/19/2021, 03/10/2021, 11/05/2021       Medications/Allergies:  Current Outpatient Medications on File Prior to Visit   Medication Sig Dispense Refill    apixaban (ELIQUIS) 5 mg Tab Take 1 tablet (5 mg total) by mouth 2 (two) times daily. 60 tablet 1    atorvastatin (LIPITOR) 40 MG tablet Take 40 mg by mouth every evening.      benztropine (COGENTIN) 1 MG tablet Take 1 mg by mouth every morning.      cetirizine (ZYRTEC) 10 MG tablet Take 10 mg by mouth once daily.      fluticasone furoate (ARNUITY ELLIPTA) 100 mcg/actuation inhaler Inhale 1 puff into the lungs once daily.       fluticasone propionate (FLONASE) 50 mcg/actuation nasal spray 2 sprays by Each Nostril route once daily.      haloperidoL (HALDOL) 5 MG tablet Take 5 mg by mouth 2 (two) times daily.      losartan (COZAAR) 50 MG tablet Take 25 mg by mouth once daily.      memantine (NAMENDA) 5 MG Tab Take 5 mg by mouth every evening.      metoprolol tartrate (LOPRESSOR) 25 MG tablet Take 1 tablet (25 mg total) by mouth 2 (two) times daily. 60 tablet 1    polyethylene glycol (GLYCOLAX) 17 gram PwPk Take 17 g by mouth once daily. 30 each 1     No  current facility-administered medications on file prior to visit.       Review of patient's allergies indicates:   Allergen Reactions    Iodine Other (See Comments)     Other reaction(s): Unknown       Review of Systems:  A comprehensive review of systems was negative. (other than +left hip pain (chronic))     Objective:     Vitals:  Vitals:    08/09/23 0936   BP: (!) 146/89   Pulse: 69   Resp: 18   Temp: 98 °F (36.7 °C)         Body mass index is 27.1 kg/m².     Physical Exam:  General: The patient is awake, alert and oriented times three. The patient is well nourished and in no acute distress.  Neck: There is no evidence of palpable cervical, supraclavicular or axillary adenopathy. The neck is supple. The thyroid is not enlarged.  Musculoskeletal: The patient has a normal range of motion of her bilateral upper extremities.  Chest: Examination of the chest wall fails to reveal any obvious abnormalities. Nonlabored breathing, symmetric expansion.  Breast:  Right: Examination of right breast fails to reveal any dominant masses or areas of significant focal nodularity. The nipple is everted without evidence of discharge. There is no skin dimpling with movement of the pectoralis. There are no significant skin changes overlying the breast.   Left: Examination of left mastectomy site fails to reveal any dominant masses or areas of significant focal nodularity. The nipple is surgically absent. There are no significant skin changes overlying the breasts. There is no skin dimpling with movement of the pectoralis.  Abdomen: The abdomen is soft, flat, nontender and nondistended.  Integumentary: no rashes or skin lesions present  Neurologic: cranial nerves intact, no signs of peripheral neurological deficit, motor/sensory function intact      Assessment and Plan:       Nancy was seen today for follow-up.    Diagnoses and all orders for this visit:    Screening mammogram, encounter for  -     Mammo Digital Screening Right  with [facility]; Future          Plan:     Recommend repeat CT abdomen, patient requests to be done at [facility].     A routine R screening mammogram in one year in the absence of significant clinical findings in the interval is recommended (July 2024).    Continue f/u with PCP and medical oncology.    She >2 years s/p surgery and is therefore recommended to transition follow up to annual visits with our office. RTC in 1 year. Will be monitoring results of CT scan in the interval.      All of her questions were answered. She was advised to call if she develops any questions or concerns.    Zarina Francois PA-C           Total time on the date of the visit ranged from 30-39 mins (03360). Total time includes both face-to-face and non-face-to-face time personally spent by myself on the day of the visit.    Non-face-to-face time included:  _X_ preparing to see the patient such as reviewing the patient record  __ obtaining and reviewing separately obtained history  _X_ independently interpreting results  _X_ documenting clinical information in electronic health record.    Face-to-face time included:  _X_ performing an appropriate history and examination  _X_ communicating results to the patient  _X_ counseling and educating the patient  __ ordering appropriate medications  _x_ ordering appropriate tests  _X_ ordering appropriate procedures (including follow-up)  _X_ answering any questions the patient had    Total Time spent on date of visit: 35 minutes

## 2024-01-19 NOTE — PROGRESS NOTES
History of Present Illness  Consulting physician: Dr. Kira Melo    Chief complaint: anatomic stage IIA / prognostic stage IIB (cT2 cN0 M0 ) multifocal Left invasive ductal carcinoma grade 3 ER 0%, DE 0%, HER2 1+ - negative on IHC .  Atypical right 4th rib lesion, possibly M1 disease    Onc Hx:  10/19/20-1/7/21: weekly taxol X12    HPI: The patient presented for routine screening mammogram on June 8, 2020. That mammogram showed a developing asymmetry at 6:00 in the left breast. On 6/18 she underwent an ultrasound and bilateral diagnostic mammograms on June 18 2020. The ultrasound showed 3 oval hypoechoic masses the largest of which was 11 mm in the left breast. Benign appearing lymph nodes in the left axilla. She went back and had an ultrasound-guided biopsy on June 23, 2020 of 2 areas : one of the 2 cm mass in the left breast and the 1.1 cm mass in the left breast. Surgical pathology of both biopsies showed invasive ductal carcinoma both of which were ER DE negative, HER-2/haritha negative. She was seen and evaluated by Dr. Melo on 7/7/2020. She was then referred to [facility] for further eval    She had PET CT 7/2020 that showed left breast lesion, b/l benign appearing LNs and a right 4th rib sclerotic lesion 10x5mm SUV 4.36    There was significant delay in treatment initiation due to transferring care to [facility]. Due to this, a repeat PET CT and repeat MRI were obtained. No significant changes were noted other than SUV of right sclerotic 4th rib lesion had decreased to 1.62    Decision made to proceed with NACT for stage II disease. Due to her multiple medical comorbidities and overall performance status she was given weekly taxol alone which was started on 10/19/20 and completed on 1/7/21    On 2/22/21 she underwent a left simple mastectomy and SLNE showing ypT1a ypN0 G3 IDC, residual focus of only 3mm, with extensive chemotherapeutic effect.    PMHx: schizophrenia, osteoporosis, HTN, HLD, IBS, DJD,asthma, urinary  incontinence, diverticulosis  PSHx: back surgery, tonsillectomy, bladder surgery, D&C x2, sinus surgery, right knee surgery  Social hx: never smoker, no drugs, no ETOH  Family Hx: maternal aunt: pancreatic cancer, brother: colon cancer  Obgyn hx: Menarche at 11, menopause at 47, no history of hormone replacement therapy no previous biopsies, first birth 25  Meds: reviewed    Interval History:  Today 24 : Patient here for follow-up visit. Today, she is doing well overall. Denies any breast complaints. Denies any dysuria or abdominal pain. She denies fevers, chills, shortness of breath, chest pain, N/V/C/D, melena, hematochezia, weight loss, bony pains, excessive fatigue, she has right shoulder, arm, hand pain that she follows with her PCP     Labs:  23 cr 0.7, wbc 5.6, hgb 10.7, plt 249   21 Cr 0.8 K 3 Alb 3.2WBC 6.2 Hg 11.9   21 WBC 5.8, Hgb 12.1, , ANC 2.75, Cr 0.7, Alb 3.2, AlkPhos 87, K 3.1  21 WBC 8.71, Hgb 10.0, , ANC 5.75, Cr 0.62, Alb 3.3, AlkPhos 79, K 2.8  21 WBC 4.8 Hg 9.6  K 3 Alb 2.9 AlkPhos 73 Cr 0.9  20 , K 3.0, CR 0.71, TP 6.6, ALB 3.5, WBC 7.71, Hg 10.1, , ANC 5.15  20 K 3.2, Cr 0.70, TP 6.3, ALB 3.3, WBC 3.99, Hg 9.9, , ANC 2.56  20 , , Cr 0.67, TP 6.6, ALB 3.5, WBC 5.31, Hgb 10.1, , ANC 3.44  20 WBC 4.53 Hg11.2  ANC 2.53  20 K 3.4, glucose 159, Cr 0.63, TP 7.1, ALB 3.5, WBC 3.13, Hg 10.6, , ANC 1.88  20 Cr 0.7 K 3.2 Alb 3.6 AST 25 ALT 26 WBC 2.73 Hg 11.4  ANC 1.63  10/19/20 Cr 0.72, K 3.3, TP 7.6, alb 3.7, WBC 8.59, Hgb 12.9, , ANC 7.13    IMAGIN/10/23: Right breast US- RONAL  07/10/23: Diagnostic right breast MMG- RONAL  2020 BL SC MG at [facility] - revealing an area of calcification sin the central right breast and a developing asymmetry in the left breast at 6:00 o'clock. BIRADS-0: additional imaging recommended    2020 BL DG MG and  Left US at [facility] - revealing benign appearing scattered, punctate, calcifications in the central right breast, a 1.1 cm oval mas with indistinct margins at 6 o'clock 3 cm FN in the left breast, and a 2.0 cm oval mass with indistinct margins and associated calcifications at 630 o'clock 4 cm FN. US of the left breast revealed 3 oval hypoechoic masses at 6 o'clock 3-4 cm FN, largest measuring 1.1 cm x 0.7 cm x 0.9 cm and two adjacent masses measuring 0.5 cm and 0.3 cm. There was also a 2.0 cm x 1.4 cm x 1.1 cm oval hypoechoic mass with indistinct margins at 630 o'clock 4 cm FN with associated echogenic foci consistent with calcifications. BIRADS-4: suspicious and biopsy is recommended.    7/30/20 PET CT: Within the inferior portion of the left breast slightly right paramedian is an irregularly defined hypermetabolic mass which measures 2.0 x 1.0 cm on axial image 61 series 4 with SUV of 3.67. There are bilateral axillary mildly enlarged but nonpathologic appearing lymph nodes with normal fatty hilum and none of these show hypermetabolism. There is also no evidence of hypermetabolic lymphadenopathy within the internal mammary chain, mediastinum or hilum.  Right anterior fourth rib hypermetabolic sclerosis measures 10 mm x 5 mm with SUV of 4.36 on image 58 series 4.    7/30/2020, MRI of the brain no intracranial findings    9/14/2020, MRI of the breast:  6/18/2022 MMG right breast BIRADS 2- Benign  LEFT BREAST:   In the 6:30 left breast, 4 cm from the nipple, there is a 21 x 21 x 19 mm (axial image 89/128) oval, heterogeneously enhancing mass with irregular margins. Signal void consistent with a biopsy clip is associated with this known malignancy.   In the 6:00 left breast, 3 to 4 cm from the nipple, there are 5 oval, enhancing masses with noncircumscribed margins. These masses are as follows:   - 11 x 9 x 11 mm (axial image 91/128); there is a signal void consistent with a biopsy clip associated with this known  malignancy.  - 6 x 4 x 6 mm (axial image 91/128)  - 10 x 4 x 7 mm (axial image 91/128)  - 6 x 6 x 6 mm (axial image 91/128)  - 6 x 4 x 6 mm (axial image 93/128); this mass is the most posterior of the masses and is 9 mm anterior to the chest wall.  There is no skin thickening or nipple retraction. No axillary or internal mammary lymphadenopathy is identified.  OTHER: There is a 9 mm enhancing mass within the right anterior fourth rib. This corresponds to the hypermetabolic lesion seen on PET CT dated 7/30/2020. A trace right-sided pleural effusion is present.    10/14/20 PET/CT: Mild interval enlargement and increased FDG avidity of the left breast mass. More sclerotic appearance of the anterior right fourth rib lesion, with decrease hypermetabolic uptake, maximum SUV is 1.62    12/1/20 MUGA with LVEF 70%    2/1/21 MRI b/l breasts: no residual abnormal enhancement is seen in the left breast at the sites of the known malignancy in the 6:00 and 6:30 left breast, indicating a favorable response to neoadjuvant chemotherapy. Enhancing mass within the right anterior fourth rib is less conspicuous on the current exam. While the mass is nonspecific, metastatic disease remains in the differential.    6/10/21 MG KWABENA RT Breast: Diffuse calcifications in the right breast are without significant change. RONAL noted. F/U screening X 1 year (6/2022).    PATHOLOGY:  1. 6/26/2020 US-Guided Core Biopsy Left Breast 2 cm Mass 630 o'clock 4 cm FN - INVASIVE DUCTAL CARCINOMA, GRADE 3 WITH MICROCALCIFICATIONS  ER 0.00%, MT 0.00%, HER2 1+ on IHC/negative.  2. 6/26/2020 US-Guided Core Biopsy Left breast 1.1 cm Mass 6 o'clock 3-4 cm FN - INVASIVE DUCTAL CARCINOMA, GRADE 3 WITH MICROCALCIFICATIONS  ER 0.00%, MT 0.00%, HER2 1+ on IHC/negative.    2/22/21 left simple mastectomy: residual IDC G3, 3mm, 6 oclock position. Margins neg, closest is inferior/anterior at 10mm. No LVI. 2 axillary SLN negative. Positive response to NACT. ypT1a  ypN0    Review of Systems  CONSTITUTIONAL: no fevers, no chills, no weight loss, no fatigue, no weakness  HEMATOLOGIC: no abnormal bleeding, no abnormal bruising, no drenching night sweats  ONCOLOGIC: no new masses or lumps  HEENT: no vision loss, no tinnitus or hearing loss, no nose bleeding, no dysphagia, no odynophagia  CVS: no chest pain, no palpitations, no dyspnea on exertion  RESP: no shortness of breath, no hemoptysis, no cough  BREAST: no nipple discharge, no breast tenderness, no breast masses on self breast examination  GI: no nausea, no vomiting, no diarrhea, no constipation, no melena, no hematochezia, no hematemesis, no abdominal pain, no increase in abdominal girth  : no dysuria, no hematuria, no discharge  GYN: +abnormal vaginal bleeding, no dyspareunia, no vaginal discharge  INTEGUMENT: no rashes, no abnormal bruising, no nail pitting, no hyperpigmentation  NEURO: no falls, no memory loss, no paresthesias or dysesthesias, no urofecal incontinence or retention, no loss of strength on any extremity  MSK: no back pain, +pain in right shoulder, no joint swelling  PSYCH: no suicidal or homicidal ideation, no depression, no insomnia, no anhedonia  ENDOCRINE: no heat or cold intolerance, no polyuria, no polydipsia    Physical Exam  Vitals:    01/22/24 1420   BP: (!) 158/98   Pulse: 72   Resp: 20   Temp: 98.3 °F (36.8 °C)       ECOG PS 2  GA: AAOx3, NAD  HEENT: NCAT, PERRLA, EOMI, fair dentition, no oral ulcers  LYMPH: no cervical, axillary or supraclavicular adenopathy  CVS: s1s2 RRR, no M/R/G  RESP: CTA b/l, no crackles, no wheezes or rhonchi  Breast: Left: s/p mastectomy, Right: no masses, lumps, no nipple inversion, no peau d'orange  ABD: soft, NT, ND, BS+, no hepatosplenomegaly  EXT: no deformities, + 2 BLE edema, no pedal edema  SKIN: no rashes, no bruises or purpura, warm and dry  NEURO: normal mentation, strength 5/5 on all 4 extremities, no sensory deficits    Assessment/Plan   1. Cancer of  overlapping sites of left female breast C50.812  Multifocal left breast IDC G3 TN T2N0 w/ suspicious right lytic rib lesion  10/14/20 PET/CT: Mild interval enlargement and increased FDG avidity of the left breast mass. More sclerotic appearance of the anterior right fourth rib lesion, with decreased hypermetabolic uptake, unchanged in comparison.  Mediport placed on 10/12/20 with Dr. Lorie RIVAS 12/1/20 with LVEF 70%  Completed 12 weeks of taxol  Post treatment MRI b/l breasts 2/1/21 shows complete response. Enhancing mass within the right anterior fourth rib is less conspicuous. While the mass is nonspecific, metastatic disease remains in the differential.  L simple mastectomy and SLNE with ypT1a ypN0 with extensive pathologic response and only minimal residual IDC of 3mm  She was referred to [facility], RT not recommended due to multiple comorbidities.  MD discussed possibility of adjuvant Xeloda. He was not sure how well she could tolerate it but we did offer it. She did not wish to try Xeloda and opted to proceed with surveillance monitoring.  MMG of right breast 6/21 normal. Repeat MMG Right breast due 1 year (6/2022), already ordered per surgery  Triple neg breast cancer, has still not done genetic counseling, (pt declines at this time)  C/w port flush    Plan  1/22/24  RTC in 6 months No labs needed  Right breast MMG due 07/11/24.   Continue f/u with Dr. Melo office   Continue with Port flush  Call if any questions or concerns    Marli Orosco FNP-C

## 2024-01-22 ENCOUNTER — OFFICE VISIT (OUTPATIENT)
Dept: HEMATOLOGY/ONCOLOGY | Facility: CLINIC | Age: 74
End: 2024-01-22
Payer: MEDICARE

## 2024-01-22 VITALS
WEIGHT: 155 LBS | BODY MASS INDEX: 27.46 KG/M2 | DIASTOLIC BLOOD PRESSURE: 98 MMHG | HEIGHT: 63 IN | TEMPERATURE: 98 F | SYSTOLIC BLOOD PRESSURE: 158 MMHG | HEART RATE: 72 BPM | OXYGEN SATURATION: 99 % | RESPIRATION RATE: 20 BRPM

## 2024-01-22 DIAGNOSIS — Z17.1 MALIGNANT NEOPLASM OF OVERLAPPING SITES OF LEFT BREAST IN FEMALE, ESTROGEN RECEPTOR NEGATIVE: Primary | ICD-10-CM

## 2024-01-22 DIAGNOSIS — C50.812 MALIGNANT NEOPLASM OF OVERLAPPING SITES OF LEFT BREAST IN FEMALE, ESTROGEN RECEPTOR NEGATIVE: Primary | ICD-10-CM

## 2024-01-22 PROCEDURE — 99214 OFFICE O/P EST MOD 30 MIN: CPT | Mod: ,,,

## 2024-01-22 RX ORDER — HYDROCHLOROTHIAZIDE 12.5 MG/1
12.5 CAPSULE ORAL DAILY
COMMUNITY
Start: 2024-01-11

## 2024-01-22 RX ORDER — MIRABEGRON 25 MG/1
25 TABLET, FILM COATED, EXTENDED RELEASE ORAL DAILY
COMMUNITY
Start: 2024-01-08

## 2024-02-23 ENCOUNTER — TELEPHONE (OUTPATIENT)
Dept: HEMATOLOGY/ONCOLOGY | Facility: CLINIC | Age: 74
End: 2024-02-23

## 2024-02-23 DIAGNOSIS — C50.812 MALIGNANT NEOPLASM OF OVERLAPPING SITES OF LEFT BREAST IN FEMALE, ESTROGEN RECEPTOR NEGATIVE: Primary | ICD-10-CM

## 2024-02-23 DIAGNOSIS — Z17.1 MALIGNANT NEOPLASM OF OVERLAPPING SITES OF LEFT BREAST IN FEMALE, ESTROGEN RECEPTOR NEGATIVE: Primary | ICD-10-CM

## 2024-07-29 ENCOUNTER — OFFICE VISIT (OUTPATIENT)
Dept: HEMATOLOGY/ONCOLOGY | Facility: CLINIC | Age: 74
End: 2024-07-29
Payer: MEDICARE

## 2024-07-29 VITALS
DIASTOLIC BLOOD PRESSURE: 72 MMHG | HEART RATE: 73 BPM | SYSTOLIC BLOOD PRESSURE: 106 MMHG | TEMPERATURE: 98 F | HEIGHT: 63 IN | BODY MASS INDEX: 26.91 KG/M2 | OXYGEN SATURATION: 97 % | RESPIRATION RATE: 18 BRPM | WEIGHT: 151.88 LBS

## 2024-07-29 DIAGNOSIS — Z45.2 ENCOUNTER FOR REMOVAL OF TUNNELED CENTRAL VENOUS CATHETER (CVC) WITH PORT: ICD-10-CM

## 2024-07-29 DIAGNOSIS — C50.812 MALIGNANT NEOPLASM OF OVERLAPPING SITES OF LEFT BREAST IN FEMALE, ESTROGEN RECEPTOR NEGATIVE: Primary | ICD-10-CM

## 2024-07-29 DIAGNOSIS — Z17.1 MALIGNANT NEOPLASM OF OVERLAPPING SITES OF LEFT BREAST IN FEMALE, ESTROGEN RECEPTOR NEGATIVE: Primary | ICD-10-CM

## 2024-07-29 RX ORDER — AMLODIPINE BESYLATE 10 MG/1
10 TABLET ORAL
COMMUNITY
Start: 2024-07-03

## 2024-07-29 RX ORDER — TRIFLUOPERAZINE HYDROCHLORIDE 2 MG/1
2 TABLET, FILM COATED ORAL DAILY
COMMUNITY

## 2024-07-29 NOTE — PROGRESS NOTES
History of Present Illness  Consulting physician: Dr. Kira Melo    Chief complaint: anatomic stage IIA / prognostic stage IIB (cT2 cN0 M0 ) multifocal Left invasive ductal carcinoma grade 3 ER 0%, AK 0%, HER2 1+ - negative on IHC .  Atypical right 4th rib lesion, possibly M1 disease    Onc Hx:  10/19/20-1/7/21: weekly taxol X12    HPI: The patient presented for routine screening mammogram on June 8, 2020. That mammogram showed a developing asymmetry at 6:00 in the left breast. On 6/18 she underwent an ultrasound and bilateral diagnostic mammograms on June 18 2020. The ultrasound showed 3 oval hypoechoic masses the largest of which was 11 mm in the left breast. Benign appearing lymph nodes in the left axilla. She went back and had an ultrasound-guided biopsy on June 23, 2020 of 2 areas : one of the 2 cm mass in the left breast and the 1.1 cm mass in the left breast. Surgical pathology of both biopsies showed invasive ductal carcinoma both of which were ER AK negative, HER-2/haritha negative. She was seen and evaluated by Dr. Melo on 7/7/2020. She was then referred to [facility] for further eval    She had PET CT 7/2020 that showed left breast lesion, b/l benign appearing LNs and a right 4th rib sclerotic lesion 10x5mm SUV 4.36    There was significant delay in treatment initiation due to transferring care to [facility]. Due to this, a repeat PET CT and repeat MRI were obtained. No significant changes were noted other than SUV of right sclerotic 4th rib lesion had decreased to 1.62    Decision made to proceed with NACT for stage II disease. Due to her multiple medical comorbidities and overall performance status she was given weekly taxol alone which was started on 10/19/20 and completed on 1/7/21    On 2/22/21 she underwent a left simple mastectomy and SLNE showing ypT1a ypN0 G3 IDC, residual focus of only 3mm, with extensive chemotherapeutic effect.    PMHx: schizophrenia, osteoporosis, HTN, HLD, IBS, DJD,asthma, urinary  incontinence, diverticulosis  PSHx: back surgery, tonsillectomy, bladder surgery, D&C x2, sinus surgery, right knee surgery  Social hx: never smoker, no drugs, no ETOH  Family Hx: maternal aunt: pancreatic cancer, brother: colon cancer  Obgyn hx: Menarche at 11, menopause at 47, no history of hormone replacement therapy no previous biopsies, first birth 25  Meds: reviewed    Interval History:  Today 24 : Patient here for follow-up visit. She is requesting to have Mediport removed. Today, she is doing well overall. Denies any breast complaints. Denies any dysuria or abdominal pain. She denies fevers, chills, shortness of breath, chest pain, N/V/C/D, melena, hematochezia, weight loss, bony pains, or excessive fatigue.    Labs:  23 cr 0.7, wbc 5.6, hgb 10.7, plt 249   21 Cr 0.8 K 3 Alb 3.2WBC 6.2 Hg 11.9   21 WBC 5.8, Hgb 12.1, , ANC 2.75, Cr 0.7, Alb 3.2, AlkPhos 87, K 3.1  21 WBC 8.71, Hgb 10.0, , ANC 5.75, Cr 0.62, Alb 3.3, AlkPhos 79, K 2.8  21 WBC 4.8 Hg 9.6  K 3 Alb 2.9 AlkPhos 73 Cr 0.9  20 , K 3.0, CR 0.71, TP 6.6, ALB 3.5, WBC 7.71, Hg 10.1, , ANC 5.15  20 K 3.2, Cr 0.70, TP 6.3, ALB 3.3, WBC 3.99, Hg 9.9, , ANC 2.56  20 , , Cr 0.67, TP 6.6, ALB 3.5, WBC 5.31, Hgb 10.1, , ANC 3.44  20 WBC 4.53 Hg11.2  ANC 2.53  20 K 3.4, glucose 159, Cr 0.63, TP 7.1, ALB 3.5, WBC 3.13, Hg 10.6, , ANC 1.88  20 Cr 0.7 K 3.2 Alb 3.6 AST 25 ALT 26 WBC 2.73 Hg 11.4  ANC 1.63  10/19/20 Cr 0.72, K 3.3, TP 7.6, alb 3.7, WBC 8.59, Hgb 12.9, , ANC 7.13    IMAGIN/10/23: Right breast US- RONAL  07/10/23: Diagnostic right breast MMG- RONAL  2020 BL SC MG at [facility] - revealing an area of calcification sin the central right breast and a developing asymmetry in the left breast at 6:00 o'clock. BIRADS-0: additional imaging recommended    2020 BL DG MG and Left US at [facility] -  revealing benign appearing scattered, punctate, calcifications in the central right breast, a 1.1 cm oval mas with indistinct margins at 6 o'clock 3 cm FN in the left breast, and a 2.0 cm oval mass with indistinct margins and associated calcifications at 630 o'clock 4 cm FN. US of the left breast revealed 3 oval hypoechoic masses at 6 o'clock 3-4 cm FN, largest measuring 1.1 cm x 0.7 cm x 0.9 cm and two adjacent masses measuring 0.5 cm and 0.3 cm. There was also a 2.0 cm x 1.4 cm x 1.1 cm oval hypoechoic mass with indistinct margins at 630 o'clock 4 cm FN with associated echogenic foci consistent with calcifications. BIRADS-4: suspicious and biopsy is recommended.    7/30/20 PET CT: Within the inferior portion of the left breast slightly right paramedian is an irregularly defined hypermetabolic mass which measures 2.0 x 1.0 cm on axial image 61 series 4 with SUV of 3.67. There are bilateral axillary mildly enlarged but nonpathologic appearing lymph nodes with normal fatty hilum and none of these show hypermetabolism. There is also no evidence of hypermetabolic lymphadenopathy within the internal mammary chain, mediastinum or hilum.  Right anterior fourth rib hypermetabolic sclerosis measures 10 mm x 5 mm with SUV of 4.36 on image 58 series 4.    7/30/2020, MRI of the brain no intracranial findings    9/14/2020, MRI of the breast:  6/18/2022 MMG right breast BIRADS 2- Benign  LEFT BREAST:   In the 6:30 left breast, 4 cm from the nipple, there is a 21 x 21 x 19 mm (axial image 89/128) oval, heterogeneously enhancing mass with irregular margins. Signal void consistent with a biopsy clip is associated with this known malignancy.   In the 6:00 left breast, 3 to 4 cm from the nipple, there are 5 oval, enhancing masses with noncircumscribed margins. These masses are as follows:   - 11 x 9 x 11 mm (axial image 91/128); there is a signal void consistent with a biopsy clip associated with this known malignancy.  - 6 x 4 x 6  mm (axial image 91/128)  - 10 x 4 x 7 mm (axial image 91/128)  - 6 x 6 x 6 mm (axial image 91/128)  - 6 x 4 x 6 mm (axial image 93/128); this mass is the most posterior of the masses and is 9 mm anterior to the chest wall.  There is no skin thickening or nipple retraction. No axillary or internal mammary lymphadenopathy is identified.  OTHER: There is a 9 mm enhancing mass within the right anterior fourth rib. This corresponds to the hypermetabolic lesion seen on PET CT dated 7/30/2020. A trace right-sided pleural effusion is present.    10/14/20 PET/CT: Mild interval enlargement and increased FDG avidity of the left breast mass. More sclerotic appearance of the anterior right fourth rib lesion, with decrease hypermetabolic uptake, maximum SUV is 1.62    12/1/20 MUGA with LVEF 70%    2/1/21 MRI b/l breasts: no residual abnormal enhancement is seen in the left breast at the sites of the known malignancy in the 6:00 and 6:30 left breast, indicating a favorable response to neoadjuvant chemotherapy. Enhancing mass within the right anterior fourth rib is less conspicuous on the current exam. While the mass is nonspecific, metastatic disease remains in the differential.    6/10/21 MG KWABENA RT Breast: Diffuse calcifications in the right breast are without significant change. RONAL noted. F/U screening X 1 year (6/2022).    PATHOLOGY:  1. 6/26/2020 US-Guided Core Biopsy Left Breast 2 cm Mass 630 o'clock 4 cm FN - INVASIVE DUCTAL CARCINOMA, GRADE 3 WITH MICROCALCIFICATIONS  ER 0.00%, VT 0.00%, HER2 1+ on IHC/negative.  2. 6/26/2020 US-Guided Core Biopsy Left breast 1.1 cm Mass 6 o'clock 3-4 cm FN - INVASIVE DUCTAL CARCINOMA, GRADE 3 WITH MICROCALCIFICATIONS  ER 0.00%, VT 0.00%, HER2 1+ on IHC/negative.    2/22/21 left simple mastectomy: residual IDC G3, 3mm, 6 oclock position. Margins neg, closest is inferior/anterior at 10mm. No LVI. 2 axillary SLN negative. Positive response to NACT. ypT1a ypN0    Review of  Systems  CONSTITUTIONAL: no fevers, no chills, no weight loss, no fatigue, no weakness  HEMATOLOGIC: no abnormal bleeding, no abnormal bruising, no drenching night sweats  ONCOLOGIC: no new masses or lumps  HEENT: no vision loss, no tinnitus or hearing loss, no nose bleeding, no dysphagia, no odynophagia  CVS: no chest pain, no palpitations, no dyspnea on exertion  RESP: no shortness of breath, no hemoptysis, no cough  BREAST: no nipple discharge, no breast tenderness, no breast masses on self breast examination  GI: no nausea, no vomiting, no diarrhea, no constipation, no melena, no hematochezia, no hematemesis, no abdominal pain, no increase in abdominal girth  : no dysuria, no hematuria, no discharge  GYN: +abnormal vaginal bleeding, no dyspareunia, no vaginal discharge  INTEGUMENT: no rashes, no abnormal bruising, no nail pitting, no hyperpigmentation  NEURO: no falls, no memory loss, no paresthesias or dysesthesias, no urofecal incontinence or retention, no loss of strength on any extremity  MSK: no back pain, +pain in right shoulder, no joint swelling  PSYCH: no suicidal or homicidal ideation, no depression, no insomnia, no anhedonia  ENDOCRINE: no heat or cold intolerance, no polyuria, no polydipsia    Physical Exam  Vitals:    07/29/24 1414   BP: 106/72   Pulse: 73   Resp: 18   Temp: 98.2 °F (36.8 °C)       ECOG PS 2  GA: AAOx3, NAD  HEENT: NCAT, PERRLA, EOMI, fair dentition, no oral ulcers  LYMPH: no cervical, axillary or supraclavicular adenopathy  CVS: s1s2 RRR, no M/R/G  RESP: CTA b/l, no crackles, no wheezes or rhonchi  Breast: Left: s/p mastectomy, Right: no masses, lumps, no nipple inversion, no peau d'orange  ABD: soft, NT, ND, BS+, no hepatosplenomegaly  EXT: no deformities, + 2 BLE edema, no pedal edema  SKIN: no rashes, no bruises or purpura, warm and dry  NEURO: normal mentation, strength 5/5 on all 4 extremities, no sensory deficits    Assessment/Plan   1. Cancer of overlapping sites of left  female breast C50.812  Multifocal left breast IDC G3 TN T2N0 w/ suspicious right lytic rib lesion  10/14/20 PET/CT: Mild interval enlargement and increased FDG avidity of the left breast mass. More sclerotic appearance of the anterior right fourth rib lesion, with decreased hypermetabolic uptake, unchanged in comparison.  Mediport placed on 10/12/20 with Dr. Lorie RIVAS 12/1/20 with LVEF 70%  Completed 12 weeks of taxol  Post treatment MRI b/l breasts 2/1/21 shows complete response. Enhancing mass within the right anterior fourth rib is less conspicuous. While the mass is nonspecific, metastatic disease remains in the differential.  L simple mastectomy and SLNE with ypT1a ypN0 with extensive pathologic response and only minimal residual IDC of 3mm  She was referred to [facility], RT not recommended due to multiple comorbidities.  MD discussed possibility of adjuvant Xeloda. He was not sure how well she could tolerate it but we did offer it. She did not wish to try Xeloda and opted to proceed with surveillance monitoring.  MMG of right breast 6/21 normal. Repeat MMG Right breast due 1 year (6/2022), already ordered per surgery  Triple neg breast cancer, has still not done genetic counseling, (pt declines at this time)  C/w port flush    Plan    RTC in 6 months No labs needed  Right breast MMG done and appt with dr. Melo 2 weeks on 8/14/24  Continue f/u with Dr. Melo office   Continue with Port flush today. Referral placed for mediport removal with Dr. Melo.   Call if any questions or concerns

## 2024-08-09 RX ORDER — PREDNISONE 20 MG/1
20 TABLET ORAL
COMMUNITY
Start: 2024-04-15

## 2024-08-14 ENCOUNTER — OFFICE VISIT (OUTPATIENT)
Dept: SURGERY | Facility: CLINIC | Age: 74
End: 2024-08-14
Payer: MEDICARE

## 2024-08-14 VITALS
HEART RATE: 84 BPM | OXYGEN SATURATION: 99 % | WEIGHT: 149 LBS | SYSTOLIC BLOOD PRESSURE: 122 MMHG | RESPIRATION RATE: 18 BRPM | HEIGHT: 63 IN | TEMPERATURE: 98 F | DIASTOLIC BLOOD PRESSURE: 81 MMHG | BODY MASS INDEX: 26.4 KG/M2

## 2024-08-14 DIAGNOSIS — Z90.12 HISTORY OF LEFT MASTECTOMY: ICD-10-CM

## 2024-08-14 DIAGNOSIS — Z85.3 ENCOUNTER FOR FOLLOW-UP SURVEILLANCE OF BREAST CANCER: Primary | ICD-10-CM

## 2024-08-14 DIAGNOSIS — Z12.31 SCREENING MAMMOGRAM, ENCOUNTER FOR: ICD-10-CM

## 2024-08-14 DIAGNOSIS — Z08 ENCOUNTER FOR FOLLOW-UP SURVEILLANCE OF BREAST CANCER: Primary | ICD-10-CM

## 2024-08-14 PROCEDURE — 1101F PT FALLS ASSESS-DOCD LE1/YR: CPT | Mod: CPTII,S$GLB,, | Performed by: PHYSICIAN ASSISTANT

## 2024-08-14 PROCEDURE — 3079F DIAST BP 80-89 MM HG: CPT | Mod: CPTII,S$GLB,, | Performed by: PHYSICIAN ASSISTANT

## 2024-08-14 PROCEDURE — 3288F FALL RISK ASSESSMENT DOCD: CPT | Mod: CPTII,S$GLB,, | Performed by: PHYSICIAN ASSISTANT

## 2024-08-14 PROCEDURE — 99999 PR PBB SHADOW E&M-EST. PATIENT-LVL V: CPT | Mod: PBBFAC,,, | Performed by: PHYSICIAN ASSISTANT

## 2024-08-14 PROCEDURE — 99214 OFFICE O/P EST MOD 30 MIN: CPT | Mod: S$GLB,,, | Performed by: PHYSICIAN ASSISTANT

## 2024-08-14 PROCEDURE — 4010F ACE/ARB THERAPY RXD/TAKEN: CPT | Mod: CPTII,S$GLB,, | Performed by: PHYSICIAN ASSISTANT

## 2024-08-14 PROCEDURE — 1125F AMNT PAIN NOTED PAIN PRSNT: CPT | Mod: CPTII,S$GLB,, | Performed by: PHYSICIAN ASSISTANT

## 2024-08-14 PROCEDURE — 1159F MED LIST DOCD IN RCRD: CPT | Mod: CPTII,S$GLB,, | Performed by: PHYSICIAN ASSISTANT

## 2024-08-14 PROCEDURE — 3008F BODY MASS INDEX DOCD: CPT | Mod: CPTII,S$GLB,, | Performed by: PHYSICIAN ASSISTANT

## 2024-08-14 PROCEDURE — 1160F RVW MEDS BY RX/DR IN RCRD: CPT | Mod: CPTII,S$GLB,, | Performed by: PHYSICIAN ASSISTANT

## 2024-08-14 PROCEDURE — 3074F SYST BP LT 130 MM HG: CPT | Mod: CPTII,S$GLB,, | Performed by: PHYSICIAN ASSISTANT

## 2024-08-14 NOTE — PROGRESS NOTES
[facility] - [facility] [place] [facility]  Breast Surgical Oncology  Follow-Up Patient Office Visit       PCP: Alejandro Forbes MD   Care Team:   Medical Oncologist: Wiley Solano MD   Nursing home: [facility] [phone]    Chief Complaint:   Chief Complaint   Patient presents with    Follow-up     Annual CBE follow up visit.         Subjective:   Treatment History:  1. Right subclavian Mediport placement 10/12/2020  2. Neoadjuvant chemotherapy weekly Taxol x 12 cycles 10/19/2020 - 1/7/2021  3. Left Simple Mastectomy with SLNB 2/22/2021  4. She was referred to [facility], RT not recommended due to multiple comorbidities.  5. Xeloda offered for adjuvant treatment, declined.  6. Genetic counseling referral declined.    Interval History:  8/14/2024 - Nancy Parekh is doing well and is here for annual follow up. She reports no breast or mastectomy issues today. She would like her port removed. We have tried to contact her for this but have been unable to reach her. She had a follow up CT to evaluate adrenal and liver lesions on 7/12 which showed longterm stability of these lesions.     HPI:  Nancy Parekh initially presented on 10/7/2020 with AJCC 8th ed clinical anatomic stage IIA / prognostic stage IIB (cT2 cN0 M0) multifocal Left invasive ductal carcinoma grade 3 ER 0%, MN 0%, HER2 1+ - negative on IHC, possible Stage IV with right anterior rib lesion. She has completed neoadjuvant weekly Taxol x 12 cycles (10/19/2020 - 1/7/2021). She is SP left simple mastectomy with SLNB on 2/22/2021. Final pathology revealed AJCC 8th ed pathological stage (pT1a pN0 M1?) left breast residual focus of invasive ductal carcinoma, grade 3 with extensive chemotherapeutic effect, measuring 3 mm at 6 o'clock, and closed margin 1.0 cm. Biopsy site changes at 6:30 o'clock with no residual carcinoma identified. Two sentinel lymph nodes negative for metastatic carcinoma. She follows with oncologist in [facility]  [place] for surveillance as well. Since her diagnosis, she was diagnosed with dementia and has has moved to a Nursing Home in [place].     At follow up visit in 2022, while I was reviewing prior imaging,we were made aware of a CT scans performed performed at [facility] in 3/2021 (previously these scans were unknown to us and only visible now due to EMR merge with [facility]). CT Chest noted two well-circumscribed hepatic hypodensities possibly representing cysts and an additional questionable 1.5 cm left adrenal nodule in the partially visualized upper abdomen. There were no comparison images available for this scan and correlation was recommended. If no correlate seen, CT Abdomen was recommended. She denies any abdominal symptoms including changes in bowel movements, abdominal pain, or urinary symptoms. Spoke with Dr. Matt Gomes who reviewed the 3/2021 PET CT study and the [facility] PET images from 10/14/2020. Liver lesions or left adrenal nodule are not clearly visualized on the PET scan. This could mean they are new since , or it could be due to the low dose noncontrast CT protocol used for the PET studies.  It was recommended to proceed with follow up abdominal CT. She had this follow up CT at [facility] on 2022 which showed findings favoring benign etiology and another follow up CT recommended. She did not present for this repeat imaging again until 2024 which showed longterm stability of these lesions.     Imagin. 2020 BL SC MG at [facility] - revealing an area of calcifications in the central right breast and a developing asymmetry in the left breast at 6:00 o'clock. BIRADS-0: additional imaging recommended    2. 2020 BL DG MG and Left US at [facility] - revealing benign appearing scattered, punctate, calcifications in the central right breast, a 1.1 cm oval mas with indistinct margins at 6 o'clock 3 cm FN in the left breast, and a 2.0 cm oval mass with indistinct  margins and associated calcifications at 630 o'clock 4 cm FN. US of the left breast revealed 3 oval hypoechoic masses at 6 o'clock 3-4 cm FN, largest measuring 1.1 cm x 0.7 cm x 0.9 cm and two adjacent masses measuring 0.5 cm and 0.3 cm. There was also a 2.0 cm x 1.4 cm x 1.1 cm oval hypoechoic mass with indistinct margins at 630 o'clock 4 cm FN with associated echogenic foci consistent with calcifications. BIRADS-4: suspicious and biopsy is recommended.    3. 7/30/2020 PET CT at [facility] - which revealed inferior left breast hypermetabolic mass consistent with biopsy-proven carcinoma and right anterior fourth rib hypermetabolic sclerosis suspected to be metastatic    4. 7/30/2020 MRI Brain at [facility] - which revealed no acute intracranial findings or metastatic evidence and mild chronic microangiographic ischemia.    5. 9/14/2020 BL Breast MRI at [facility] - which revealed at 6:30 in the left breast 4m FN a 2.1 cm x 2.1 cm x 1.9 cm heterogeneously enhancing mass with irregular margins and signal void consistent with biopsy-proven malignancy is seen, at 6 o'clock 3-4 cm FN there is are 5 oval, enhancing masses with non-circumscribed margins: 1.1 cm x 0.9 cm x 1.1 cm (signal void consistent with a biopsy-proven malignancy),and four additional masses ranging in size from 0.6 cm to 1.0 cm. There is an 9 mm enhancing mass within the right anterior fourth rib corresponding to spot seen on PET CT and trace right-sided pleural effusion.    6. 10/14/2020 PET CT at [facility] -which revealed mild interval enlargement and increased FDG avidity the left breast mass, anterior right 4th rib lesion appears more sclerotic in appearance compared to previous with decreased hypermetabolic uptake, and otherwise no evidence for metastatic disease    7. 2/1/2021 BL Breast MRI at [facility] - which revealed in the right breast no suspicious areas of enhancement or areas of architectural distortion, and the left breast 2 clips denote sites of known malignancy  at 6:00 and 630 the previously described enhancing mass is no longer enhancing on this current MRI, no skin thickening or nipple retraction is noted, and no axillary or internal mammary lymph nodes are present. The enhancing mass that was within the right anterior fourth rib is less conspicuous on current exam. While the mass is nonspecific cannot exclude metastatic disease.    8. 3/21/2021 CT Head W/o Contrast (due to head trauma) at [facility] ED - No obvious acute abnormality.    9. 3/21/2021 CT CHEST (PE Study) at [facility] ED - 1.  Findings compatible with pulmonary thromboembolism involving the distal right and left pulmonary arteries extending into the segmental branches of the lingula, left lower lobe, right upper lobe, and right lower lobe.  There is mild right-sided heart prominence and component of right-sided heart strain is not excluded on the basis of this study.  2.  Postsurgical change of left-sided mastectomy with surgical drainage catheter in place.  There is mild induration of the subcutaneous soft tissues, presumably postoperative in etiology without discrete drainable fluid collection of the left chest wall.  3.  Mildly patulous esophagus with high density material layering in the midthoracic esophagus which may relate to recently administered/ingested medication.  Clinical correlation is recommended.  4.  Two well-circumscribed hepatic hypodensities possibly representing cysts.  Additional questionable 1.5 cm left adrenal nodule in the partially visualized upper abdomen.  Correlation with any prior abdominal preoperative imaging advised in this patient with history of breast malignancy.  Nonemergent dedicated abdominal CT follow-up could be performed for further characterization if not previously done.    10. 6/10/2021 R SCR MG at [facility] - BIRADS 2: BENIGN: A subcutaneous infusion port partially obscures evaluation of the right axilla.   Diffuse calcifications in the right breast are without signficant  change.   No new concerning masses, calcifications, or other findings are seen in the right breast.  There has been no significant interval change.    10. 6/18/2022 R SCR MG at [facility] - BIRADS 2: BENIGN: A port-a-catheter partially obscures evaluation of the right axilla. There are morphologically similar calcifications diffusely distributed throughout the right breast, with slow increase in their number over the years. These are considered benign.  No significant masses, calcifications, or other findings are seen in the breast.  There has been no significant interval change.     11.  CT Abdomen w/ w/o contrast at [facility] 9/12/2022      R SCR MG at [facility] in [place], [place]  - BIRADS 2: BENIGN: No evidence of suspicious dominant masses or microcalcifications. No skin thickening or nipple retraction. Mediport is seen within the right axilla.    7/12/2024 CT abdomen at [facility] in [place] to follow up Adrenal and liver lesion  - Stable small left adrenal adenomas. Stable hepatic cysts. No suspicious pancreatic lesion. Evidence of loosening of the S1 pedicle screws of the lumbar spine fusion construct.    Pathology:  1. 6/26/2020 US-Guided Core Biopsy Left Breast 2 cm Mass 630 o'clock 4 cm FN - INVASIVE DUCTAL CARCINOMA, GRADE 3 WITH MICROCALCIFICATIONS  ER 0.00%, DE 0/00%, HER2 1+ on IHC/negative.    2. 6/26/2020 US-Guided Core Biopsy Left breast 1.1 cm Mass 6 o'clock 3-4 cm FN - INVASIVE DUCTAL CARCINOMA, GRADE 3 WITH MICROCALCIFICATIONS  ER 0.00%, DE 0/00%, HER2 1+ on IHC/negative.    OB/GYN History:  Menarche Onset: 11  Menopause: Post, at age: 42  Hormonal birth control (duration): yes, 20years started at age 26  Pregnancies: 1  Age at first pregnancy: 25  Child births: 1  Breastfeeding duration: no   Hysterectomy: no  Oophorectomy: no  HRT: no    Other:  # of breast biopsies (when and pathology results): none  MG breast density: Category B (Scattered fibroglandular)  Prior  thoracic RT: none  Genetic testing: none  Ashkenazi Hinduism descent: No    Family History:  Family History   Problem Relation Name Age of Onset    Colon cancer Brother      Pancreatic cancer Other          Patient History:  Past Medical History:   Diagnosis Date    Alzheimer's disease, unspecified (CODE)     Anemia     Arthritis     Breast cancer     Dementia associated with alcoholism, without behavioral disturbance, psychotic disturbance, mood disturbance, or anxiety, unspecified dementia severity     Disorder of adrenal gland, unspecified     GERD (gastroesophageal reflux disease)     HLD (hyperlipidemia)     HTN (hypertension)     Irritable bowel disease     Malignant neoplasm of overlapping sites of left female breast     Mixed hyperlipidemia     Osteoporosis     Pulmonary embolism     Schizophrenia        Past Surgical History:   Procedure Laterality Date    MASTECTOMY  02/2021       Social History     Socioeconomic History    Marital status:    Tobacco Use    Smoking status: Never    Smokeless tobacco: Never   Substance and Sexual Activity    Alcohol use: Not Currently    Drug use: Never       Immunization History   Administered Date(s) Administered    COVID-19, MRNA, LN-S, PF (Pfizer) (Purple Cap) 02/19/2021, 03/10/2021, 11/05/2021       Medications/Allergies:  Current Outpatient Medications on File Prior to Visit   Medication Sig Dispense Refill    amLODIPine (NORVASC) 10 MG tablet Take 10 mg by mouth.      apixaban (ELIQUIS) 5 mg Tab Take 1 tablet (5 mg total) by mouth 2 (two) times daily. 60 tablet 1    atorvastatin (LIPITOR) 40 MG tablet Take 40 mg by mouth every evening.      cetirizine (ZYRTEC) 10 MG tablet Take 10 mg by mouth once daily.      fluticasone furoate (ARNUITY ELLIPTA) 100 mcg/actuation inhaler Inhale 1 puff into the lungs once daily.       fluticasone propionate (FLONASE) 50 mcg/actuation nasal spray 2 sprays by Each Nostril route once daily.      hydroCHLOROthiazide (MICROZIDE) 12.5  mg capsule Take 12.5 mg by mouth once daily.      losartan (COZAAR) 50 MG tablet Take 25 mg by mouth once daily.      memantine (NAMENDA) 5 MG Tab Take 5 mg by mouth every evening.      MYRBETRIQ 25 mg Tb24 ER tablet Take 25 mg by mouth once daily.      polyethylene glycol (GLYCOLAX) 17 gram PwPk Take 17 g by mouth once daily. 30 each 1    predniSONE (DELTASONE) 20 MG tablet Take 20 mg by mouth.      trifluoperazine (STELAZINE) 2 MG tablet Take 2 mg by mouth once daily.      benztropine (COGENTIN) 1 MG tablet Take 1 mg by mouth every morning. (Patient not taking: Reported on 7/29/2024)      haloperidoL (HALDOL) 5 MG tablet Take 5 mg by mouth 2 (two) times daily. (Patient not taking: Reported on 7/29/2024)      metoprolol tartrate (LOPRESSOR) 25 MG tablet Take 1 tablet (25 mg total) by mouth 2 (two) times daily. 60 tablet 1     No current facility-administered medications on file prior to visit.       Review of patient's allergies indicates:   Allergen Reactions    Iodine Other (See Comments)     Other reaction(s): Unknown       Review of Systems:  A comprehensive review of systems was negative. (other than +left hip pain (chronic))     Objective:     Vitals:  Vitals:    08/14/24 1010   BP: 122/81   Pulse: 84   Resp: 18   Temp: 97.8 °F (36.6 °C)         Body mass index is 26.39 kg/m².     Physical Exam:  General: The patient is awake, alert and oriented times three. The patient is well nourished and in no acute distress.  Neck: There is no evidence of palpable cervical, supraclavicular or axillary adenopathy. The neck is supple. The thyroid is not enlarged.  Musculoskeletal: The patient has a normal range of motion of her bilateral upper extremities.  Chest: Examination of the chest wall fails to reveal any obvious abnormalities. Nonlabored breathing, symmetric expansion.  Breast:  Right: Examination of right breast fails to reveal any dominant masses or areas of significant focal nodularity. The nipple is everted  without evidence of discharge. There is no skin dimpling with movement of the pectoralis. There are no significant skin changes overlying the breast.   Left: Examination of left mastectomy site fails to reveal any dominant masses or areas of significant focal nodularity. The nipple is surgically absent. There are no significant skin changes overlying the breasts. There is no skin dimpling with movement of the pectoralis.  Abdomen: The abdomen is soft, flat, nontender and nondistended.  Integumentary: no rashes or skin lesions present  Neurologic: cranial nerves intact, no signs of peripheral neurological deficit, motor/sensory function intact      Assessment and Plan:       There are no diagnoses linked to this encounter.     8/14/2024 - Nancy Parekh is doing well and is here for annual follow up. She reports no breast or mastectomy issues today. She would like her port removed. We have tried to contact her for this but have been unable to reach her. She had a follow up CT to evaluate adrenal and liver lesions on 7/12 which showed longterm stability of these lesions.      Plan:        A routine R screening mammogram in one year in the absence of significant clinical findings in the interval is recommended (July 2025). This will be done at [facility] in [place] where her last mammogram was performed.    Continue f/u with PCP and medical oncology.    RTC in 1 year for CBE.    Patient will be called with appointment for port removal.  Explained this is done at bedside in our office using local anesthesia. Continue flushes as scheduled until removal.      CC: Dr. Solano        All of her questions were answered. She was advised to call if she develops any questions or concerns.    Zarina Francois PA-C           --------------------------------------------------------------------------------------------------------------    Total time on the date of the visit ranged from 30-39 mins ([id number]).  Total time includes both face-to-face and non-face-to-face time personally spent by myself on the day of the visit.    Non-face-to-face time included:  _X_ preparing to see the patient such as reviewing the patient record  __ obtaining and reviewing separately obtained history  _X_ independently interpreting results  _X_ documenting clinical information in electronic health record.    Face-to-face time included:  _X_ performing an appropriate history and examination  _X_ communicating results to the patient  _X_ counseling and educating the patient  __ ordering appropriate medications  __ ordering appropriate tests  _X_ ordering appropriate procedures (including follow-up)  _X_ answering any questions the patient had    Total Time spent on date of visit: 35 minutes

## 2025-01-29 ENCOUNTER — OFFICE VISIT (OUTPATIENT)
Dept: HEMATOLOGY/ONCOLOGY | Facility: CLINIC | Age: 75
End: 2025-01-29
Payer: MEDICARE

## 2025-01-29 VITALS
RESPIRATION RATE: 18 BRPM | HEIGHT: 63 IN | WEIGHT: 151.63 LBS | SYSTOLIC BLOOD PRESSURE: 117 MMHG | TEMPERATURE: 98 F | DIASTOLIC BLOOD PRESSURE: 77 MMHG | HEART RATE: 75 BPM | OXYGEN SATURATION: 98 % | BODY MASS INDEX: 26.87 KG/M2

## 2025-01-29 DIAGNOSIS — C50.812 MALIGNANT NEOPLASM OF OVERLAPPING SITES OF LEFT BREAST IN FEMALE, ESTROGEN RECEPTOR NEGATIVE: Primary | ICD-10-CM

## 2025-01-29 DIAGNOSIS — Z17.1 MALIGNANT NEOPLASM OF OVERLAPPING SITES OF LEFT BREAST IN FEMALE, ESTROGEN RECEPTOR NEGATIVE: Primary | ICD-10-CM

## 2025-01-29 LAB — BCS RECOMMENDATION EXT: NORMAL

## 2025-01-29 PROCEDURE — 3288F FALL RISK ASSESSMENT DOCD: CPT | Mod: CPTII,,,

## 2025-01-29 PROCEDURE — 99213 OFFICE O/P EST LOW 20 MIN: CPT | Mod: ,,,

## 2025-01-29 PROCEDURE — 3078F DIAST BP <80 MM HG: CPT | Mod: CPTII,,,

## 2025-01-29 PROCEDURE — 3008F BODY MASS INDEX DOCD: CPT | Mod: CPTII,,,

## 2025-01-29 PROCEDURE — 1101F PT FALLS ASSESS-DOCD LE1/YR: CPT | Mod: CPTII,,,

## 2025-01-29 PROCEDURE — 3074F SYST BP LT 130 MM HG: CPT | Mod: CPTII,,,

## 2025-01-29 NOTE — PROGRESS NOTES
History of Present Illness  Consulting physician: Dr. Kira Melo    Chief complaint: anatomic stage IIA / prognostic stage IIB (cT2 cN0 M0 ) multifocal Left invasive ductal carcinoma grade 3 ER 0%, AK 0%, HER2 1+ - negative on IHC .  Atypical right 4th rib lesion, possibly M1 disease    Onc Hx:  10/19/20-1/7/21: weekly taxol X12    HPI: The patient presented for routine screening mammogram on June 8, 2020. That mammogram showed a developing asymmetry at 6:00 in the left breast. On 6/18 she underwent an ultrasound and bilateral diagnostic mammograms on June 18 2020. The ultrasound showed 3 oval hypoechoic masses the largest of which was 11 mm in the left breast. Benign appearing lymph nodes in the left axilla. She went back and had an ultrasound-guided biopsy on June 23, 2020 of 2 areas : one of the 2 cm mass in the left breast and the 1.1 cm mass in the left breast. Surgical pathology of both biopsies showed invasive ductal carcinoma both of which were ER AK negative, HER-2/haritha negative. She was seen and evaluated by Dr. Melo on 7/7/2020. She was then referred to [facility] for further eval    She had PET CT 7/2020 that showed left breast lesion, b/l benign appearing LNs and a right 4th rib sclerotic lesion 10x5mm SUV 4.36    There was significant delay in treatment initiation due to transferring care to [facility]. Due to this, a repeat PET CT and repeat MRI were obtained. No significant changes were noted other than SUV of right sclerotic 4th rib lesion had decreased to 1.62    Decision made to proceed with NACT for stage II disease. Due to her multiple medical comorbidities and overall performance status she was given weekly taxol alone which was started on 10/19/20 and completed on 1/7/21    On 2/22/21 she underwent a left simple mastectomy and SLNE showing ypT1a ypN0 G3 IDC, residual focus of only 3mm, with extensive chemotherapeutic effect.    PMHx: schizophrenia, osteoporosis, HTN, HLD, IBS, DJD,asthma, urinary  incontinence, diverticulosis  PSHx: back surgery, tonsillectomy, bladder surgery, D&C x2, sinus surgery, right knee surgery  Social hx: never smoker, no drugs, no ETOH  Family Hx: maternal aunt: pancreatic cancer, brother: colon cancer  Obgyn hx: Menarche at 11, menopause at 47, no history of hormone replacement therapy no previous biopsies, first birth 25  Meds: reviewed    Interval History:  Today 25 : Patient here for follow-up visit. Today, she is doing well overall. Patient with mild dementia. Denies any breast complaints.    Labs:  23 cr 0.7, wbc 5.6, hgb 10.7, plt 249   21 Cr 0.8 K 3 Alb 3.2WBC 6.2 Hg 11.9   21 WBC 5.8, Hgb 12.1, , ANC 2.75, Cr 0.7, Alb 3.2, AlkPhos 87, K 3.1  21 WBC 8.71, Hgb 10.0, , ANC 5.75, Cr 0.62, Alb 3.3, AlkPhos 79, K 2.8  21 WBC 4.8 Hg 9.6  K 3 Alb 2.9 AlkPhos 73 Cr 0.9  20 , K 3.0, CR 0.71, TP 6.6, ALB 3.5, WBC 7.71, Hg 10.1, , ANC 5.15  20 K 3.2, Cr 0.70, TP 6.3, ALB 3.3, WBC 3.99, Hg 9.9, , ANC 2.56  20 , , Cr 0.67, TP 6.6, ALB 3.5, WBC 5.31, Hgb 10.1, , ANC 3.44  20 WBC 4.53 Hg11.2  ANC 2.53  20 K 3.4, glucose 159, Cr 0.63, TP 7.1, ALB 3.5, WBC 3.13, Hg 10.6, , ANC 1.88  20 Cr 0.7 K 3.2 Alb 3.6 AST 25 ALT 26 WBC 2.73 Hg 11.4  ANC 1.63  10/19/20 Cr 0.72, K 3.3, TP 7.6, alb 3.7, WBC 8.59, Hgb 12.9, , ANC 7.13    IMAGIN/10/23: Right breast US- RONAL  07/10/23: Diagnostic right breast MMG- RONAL  2020 BL SC MG at [facility] - revealing an area of calcification sin the central right breast and a developing asymmetry in the left breast at 6:00 o'clock. BIRADS-0: additional imaging recommended    2020 BL DG MG and Left US at [facility] - revealing benign appearing scattered, punctate, calcifications in the central right breast, a 1.1 cm oval mas with indistinct margins at 6 o'clock 3 cm FN in the left breast, and a 2.0 cm oval  mass with indistinct margins and associated calcifications at 630 o'clock 4 cm FN. US of the left breast revealed 3 oval hypoechoic masses at 6 o'clock 3-4 cm FN, largest measuring 1.1 cm x 0.7 cm x 0.9 cm and two adjacent masses measuring 0.5 cm and 0.3 cm. There was also a 2.0 cm x 1.4 cm x 1.1 cm oval hypoechoic mass with indistinct margins at 630 o'clock 4 cm FN with associated echogenic foci consistent with calcifications. BIRADS-4: suspicious and biopsy is recommended.    7/30/20 PET CT: Within the inferior portion of the left breast slightly right paramedian is an irregularly defined hypermetabolic mass which measures 2.0 x 1.0 cm on axial image 61 series 4 with SUV of 3.67. There are bilateral axillary mildly enlarged but nonpathologic appearing lymph nodes with normal fatty hilum and none of these show hypermetabolism. There is also no evidence of hypermetabolic lymphadenopathy within the internal mammary chain, mediastinum or hilum.  Right anterior fourth rib hypermetabolic sclerosis measures 10 mm x 5 mm with SUV of 4.36 on image 58 series 4.    7/30/2020, MRI of the brain no intracranial findings    9/14/2020, MRI of the breast:  6/18/2022 MMG right breast BIRADS 2- Benign  LEFT BREAST:   In the 6:30 left breast, 4 cm from the nipple, there is a 21 x 21 x 19 mm (axial image 89/128) oval, heterogeneously enhancing mass with irregular margins. Signal void consistent with a biopsy clip is associated with this known malignancy.   In the 6:00 left breast, 3 to 4 cm from the nipple, there are 5 oval, enhancing masses with noncircumscribed margins. These masses are as follows:   - 11 x 9 x 11 mm (axial image 91/128); there is a signal void consistent with a biopsy clip associated with this known malignancy.  - 6 x 4 x 6 mm (axial image 91/128)  - 10 x 4 x 7 mm (axial image 91/128)  - 6 x 6 x 6 mm (axial image 91/128)  - 6 x 4 x 6 mm (axial image 93/128); this mass is the most posterior of the masses and is 9 mm  anterior to the chest wall.  There is no skin thickening or nipple retraction. No axillary or internal mammary lymphadenopathy is identified.  OTHER: There is a 9 mm enhancing mass within the right anterior fourth rib. This corresponds to the hypermetabolic lesion seen on PET CT dated 7/30/2020. A trace right-sided pleural effusion is present.    10/14/20 PET/CT: Mild interval enlargement and increased FDG avidity of the left breast mass. More sclerotic appearance of the anterior right fourth rib lesion, with decrease hypermetabolic uptake, maximum SUV is 1.62    12/1/20 MUGA with LVEF 70%    2/1/21 MRI b/l breasts: no residual abnormal enhancement is seen in the left breast at the sites of the known malignancy in the 6:00 and 6:30 left breast, indicating a favorable response to neoadjuvant chemotherapy. Enhancing mass within the right anterior fourth rib is less conspicuous on the current exam. While the mass is nonspecific, metastatic disease remains in the differential.    6/10/21 MG KWABENA RT Breast: Diffuse calcifications in the right breast are without significant change. RONAL noted. F/U screening X 1 year (6/2022).    PATHOLOGY:  1. 6/26/2020 US-Guided Core Biopsy Left Breast 2 cm Mass 630 o'clock 4 cm FN - INVASIVE DUCTAL CARCINOMA, GRADE 3 WITH MICROCALCIFICATIONS  ER 0.00%, NM 0.00%, HER2 1+ on IHC/negative.  2. 6/26/2020 US-Guided Core Biopsy Left breast 1.1 cm Mass 6 o'clock 3-4 cm FN - INVASIVE DUCTAL CARCINOMA, GRADE 3 WITH MICROCALCIFICATIONS  ER 0.00%, NM 0.00%, HER2 1+ on IHC/negative.    2/22/21 left simple mastectomy: residual IDC G3, 3mm, 6 oclock position. Margins neg, closest is inferior/anterior at 10mm. No LVI. 2 axillary SLN negative. Positive response to NACT. ypT1a ypN0    Review of Systems  CONSTITUTIONAL: no fevers, no chills, no weight loss, no fatigue, no weakness  HEMATOLOGIC: no abnormal bleeding, no abnormal bruising, no drenching night sweats  ONCOLOGIC: no new masses or  lumps  HEENT: no vision loss, no tinnitus or hearing loss, no nose bleeding, no dysphagia, no odynophagia  CVS: no chest pain, no palpitations, no dyspnea on exertion  RESP: no shortness of breath, no hemoptysis, no cough  BREAST: no nipple discharge, no breast tenderness, no breast masses on self breast examination  GI: no nausea, no vomiting, no diarrhea, no constipation, no melena, no hematochezia, no hematemesis, no abdominal pain, no increase in abdominal girth  : no dysuria, no hematuria, no discharge  GYN: +abnormal vaginal bleeding, no dyspareunia, no vaginal discharge  INTEGUMENT: no rashes, no abnormal bruising, no nail pitting, no hyperpigmentation  NEURO: no falls, no memory loss, no paresthesias or dysesthesias, no urofecal incontinence or retention, no loss of strength on any extremity  MSK: no back pain, +pain in right shoulder, no joint swelling  PSYCH: no suicidal or homicidal ideation, no depression, no insomnia, no anhedonia  ENDOCRINE: no heat or cold intolerance, no polyuria, no polydipsia    Physical Exam  Vitals:    01/29/25 1043   BP: 117/77   Pulse: 75   Resp: 18   Temp: 97.9 °F (36.6 °C)       ECOG PS 2  GA: AAOx3, NAD  HEENT: NCAT, PERRLA, EOMI, fair dentition, no oral ulcers  LYMPH: no cervical, axillary or supraclavicular adenopathy  CVS: s1s2 RRR, no M/R/G  RESP: CTA b/l, no crackles, no wheezes or rhonchi  Breast: Left: s/p mastectomy, Right: no masses, lumps, no nipple inversion, no peau d'orange  ABD: soft, NT, ND, BS+, no hepatosplenomegaly  EXT: no deformities, + 2 BLE edema, no pedal edema  SKIN: no rashes, no bruises or purpura, warm and dry  NEURO: normal mentation, strength 5/5 on all 4 extremities, no sensory deficits    Assessment/Plan   1. Cancer of overlapping sites of left female breast C50.812  Multifocal left breast IDC G3 TN T2N0 w/ suspicious right lytic rib lesion  10/14/20 PET/CT: Mild interval enlargement and increased FDG avidity of the left breast mass. More  sclerotic appearance of the anterior right fourth rib lesion, with decreased hypermetabolic uptake, unchanged in comparison.  Mediport placed on 10/12/20 with Dr. Lorie RIVAS 12/1/20 with LVEF 70%  Completed 12 weeks of taxol  Post treatment MRI b/l breasts 2/1/21 shows complete response. Enhancing mass within the right anterior fourth rib is less conspicuous. While the mass is nonspecific, metastatic disease remains in the differential.  L simple mastectomy and SLNE with ypT1a ypN0 with extensive pathologic response and only minimal residual IDC of 3mm  She was referred to RadOnc, RT not recommended due to multiple comorbidities.  MD discussed possibility of adjuvant Xeloda. He was not sure how well she could tolerate it but we did offer it. She did not wish to try Xeloda and opted to proceed with surveillance monitoring.  MMG of right breast 6/21 normal. Repeat MMG Right breast due 1 year (6/2022), already ordered per surgery  Triple neg breast cancer, has still not done genetic counseling, (pt declines at this time)  C/w port flush    Plan  RTC in 6 months No labs needed  Continue f/u with Dr. Melo office for annual right breast MMG next due 7/2025  Call if any questions or concerns

## 2025-03-31 ENCOUNTER — PROCEDURE VISIT (OUTPATIENT)
Dept: SURGERY | Facility: CLINIC | Age: 75
End: 2025-03-31
Payer: MEDICARE

## 2025-03-31 VITALS
SYSTOLIC BLOOD PRESSURE: 125 MMHG | HEART RATE: 76 BPM | DIASTOLIC BLOOD PRESSURE: 80 MMHG | OXYGEN SATURATION: 99 % | BODY MASS INDEX: 26.37 KG/M2 | WEIGHT: 148.81 LBS | HEIGHT: 63 IN | TEMPERATURE: 99 F | RESPIRATION RATE: 18 BRPM

## 2025-03-31 DIAGNOSIS — Z45.2 ENCOUNTER FOR REMOVAL OF TUNNELED CENTRAL VENOUS CATHETER (CVC) WITH PORT: Primary | ICD-10-CM

## 2025-03-31 PROCEDURE — 36590 REMOVAL TUNNELED CV CATH: CPT | Mod: S$GLB,,, | Performed by: SURGERY

## 2025-03-31 RX ORDER — METOPROLOL TARTRATE 50 MG/1
50 TABLET ORAL
COMMUNITY
Start: 2025-03-17

## 2025-03-31 RX ORDER — LOSARTAN POTASSIUM 100 MG/1
100 TABLET ORAL
COMMUNITY
Start: 2025-03-17

## 2025-03-31 RX ORDER — HYDROCHLOROTHIAZIDE 25 MG/1
25 TABLET ORAL
COMMUNITY
Start: 2025-03-11

## 2025-03-31 RX ORDER — ALBUTEROL SULFATE 1.25 MG/3ML
SOLUTION RESPIRATORY (INHALATION)
COMMUNITY
Start: 2024-10-09

## 2025-03-31 NOTE — PROCEDURES
PREOPERATIVE DIAGNOSIS: Tunneled subcutaneous CV port     POSTOPERATIVE DIAGNOSIS: Same as above     Informed written consent was obtained prior to surgery after discussion of the risk and benefits.     ANESTHESIA: 10 ml of local infiltration of Lidocaine 1% with epinephrine     PROCEDURES PERFORMED: Removal of right subclavian Mediport tunneled catheter and subcutaneous port     PROCEDURE IN DETAIL:   The patient was placed supine position. The skin of the chest, at site of Mediport and previous incision was prepped and draped in standard sterile surgical fashion. An incision was planned of the right infraclavicular area about 2 fingerbreadths below the clavicle at the site of previous incision. Using Lidocaine 1% with Epinephrine the skin and subcutaneous tissues were anesthetized. The incision was made with a 15-blade. The subcutaneous tissue was dissected using sharp dissection. Hemostasis was checked and maintained. The catheter was identified with in the cavity. Using a 3-0 Monocryl suture and stitch was placed around the entry point of catheter. The catheter was there removed from the subclavian vein and clamped with hemostat. The 3-0 Monocryl suture was tied down. The subcutaneous pocket housing the Mediport was entered and the Mediport was dissected free. The Mediport and catheter were removed and photographed. Hemostasis was again checked and maintained.    The subcutaneous tissues were closed with 3-0 Monocryl and the skin closed with running 4-0 Monocryl subcuticular stitches. Dermabond was applied followed by sterile dressings.         ESTIMATED BLOOD LOSS: 1 ml          Condition: Good     Disposition: Patient tolerated the procedure well with no immediate complications     Post-procedure instructions were provided  Remove outer dressings in 24 hours  May shower after 24 hours, no baths for 2 weeks (do not submerge under water for 2 weeks)  Steri-strips to remain for up to 7 days and can be removed if  they have not fallen off by 7 days.  Apply ice pack as needed for pain - 20 minutes at a time  Over the counter pain medications - ibuprofen or Tylenol as needed      All of questions were answered    Juana Butler MD  Breast Surgery

## 2025-08-06 ENCOUNTER — TELEPHONE (OUTPATIENT)
Dept: HEMATOLOGY/ONCOLOGY | Facility: CLINIC | Age: 75
End: 2025-08-06

## 2025-08-06 ENCOUNTER — OFFICE VISIT (OUTPATIENT)
Dept: HEMATOLOGY/ONCOLOGY | Facility: CLINIC | Age: 75
End: 2025-08-06
Payer: MEDICARE

## 2025-08-06 VITALS
HEART RATE: 76 BPM | WEIGHT: 146.69 LBS | SYSTOLIC BLOOD PRESSURE: 104 MMHG | HEIGHT: 63 IN | TEMPERATURE: 98 F | RESPIRATION RATE: 12 BRPM | OXYGEN SATURATION: 98 % | BODY MASS INDEX: 25.99 KG/M2 | DIASTOLIC BLOOD PRESSURE: 70 MMHG

## 2025-08-06 DIAGNOSIS — Z17.1 MALIGNANT NEOPLASM OF OVERLAPPING SITES OF LEFT BREAST IN FEMALE, ESTROGEN RECEPTOR NEGATIVE: Primary | ICD-10-CM

## 2025-08-06 DIAGNOSIS — C50.812 MALIGNANT NEOPLASM OF OVERLAPPING SITES OF LEFT BREAST IN FEMALE, ESTROGEN RECEPTOR NEGATIVE: Primary | ICD-10-CM

## 2025-08-06 NOTE — PROGRESS NOTES
History of Present Illness  Consulting physician: Dr. Kira Melo    Chief complaint: anatomic stage IIA / prognostic stage IIB (cT2 cN0 M0 ) multifocal Left invasive ductal carcinoma grade 3 ER 0%, WA 0%, HER2 1+ - negative on IHC .  Atypical right 4th rib lesion, possibly M1 disease    Onc Hx:  10/19/20-1/7/21: weekly taxol X12    HPI: The patient presented for routine screening mammogram on June 8, 2020. That mammogram showed a developing asymmetry at 6:00 in the left breast. On 6/18 she underwent an ultrasound and bilateral diagnostic mammograms on June 18 2020. The ultrasound showed 3 oval hypoechoic masses the largest of which was 11 mm in the left breast. Benign appearing lymph nodes in the left axilla. She went back and had an ultrasound-guided biopsy on June 23, 2020 of 2 areas : one of the 2 cm mass in the left breast and the 1.1 cm mass in the left breast. Surgical pathology of both biopsies showed invasive ductal carcinoma both of which were ER WA negative, HER-2/haritha negative. She was seen and evaluated by Dr. Melo on 7/7/2020. She was then referred to [facility] for further eval    She had PET CT 7/2020 that showed left breast lesion, b/l benign appearing LNs and a right 4th rib sclerotic lesion 10x5mm SUV 4.36    There was significant delay in treatment initiation due to transferring care to [facility]. Due to this, a repeat PET CT and repeat MRI were obtained. No significant changes were noted other than SUV of right sclerotic 4th rib lesion had decreased to 1.62    Decision made to proceed with NACT for stage II disease. Due to her multiple medical comorbidities and overall performance status she was given weekly taxol alone which was started on 10/19/20 and completed on 1/7/21    On 2/22/21 she underwent a left simple mastectomy and SLNE showing ypT1a ypN0 G3 IDC, residual focus of only 3mm, with extensive chemotherapeutic effect.    PMHx: schizophrenia, osteoporosis, HTN, HLD, IBS, DJD,asthma, urinary  incontinence, diverticulosis  PSHx: back surgery, tonsillectomy, bladder surgery, D&C x2, sinus surgery, right knee surgery  Social hx: never smoker, no drugs, no ETOH  Family Hx: maternal aunt: pancreatic cancer, brother: colon cancer  Obgyn hx: Menarche at 11, menopause at 47, no history of hormone replacement therapy no previous biopsies, first birth 25  Meds: reviewed    Interval History:  Today 25 : Patient here for follow-up visit. Today, she is doing well overall. Denies any breast complaints. Patient reports having a recent MMG but we cannot find a record. It appears that she hasn't had a MMG since 10/14/24. She does suffer from dementia.    Labs:  23 cr 0.7, wbc 5.6, hgb 10.7, plt 249   21 Cr 0.8 K 3 Alb 3.2WBC 6.2 Hg 11.9   21 WBC 5.8, Hgb 12.1, , ANC 2.75, Cr 0.7, Alb 3.2, AlkPhos 87, K 3.1  21 WBC 8.71, Hgb 10.0, , ANC 5.75, Cr 0.62, Alb 3.3, AlkPhos 79, K 2.8  21 WBC 4.8 Hg 9.6  K 3 Alb 2.9 AlkPhos 73 Cr 0.9  20 , K 3.0, CR 0.71, TP 6.6, ALB 3.5, WBC 7.71, Hg 10.1, , ANC 5.15  20 K 3.2, Cr 0.70, TP 6.3, ALB 3.3, WBC 3.99, Hg 9.9, , ANC 2.56  20 , , Cr 0.67, TP 6.6, ALB 3.5, WBC 5.31, Hgb 10.1, , ANC 3.44  20 WBC 4.53 Hg11.2  ANC 2.53  20 K 3.4, glucose 159, Cr 0.63, TP 7.1, ALB 3.5, WBC 3.13, Hg 10.6, , ANC 1.88  20 Cr 0.7 K 3.2 Alb 3.6 AST 25 ALT 26 WBC 2.73 Hg 11.4  ANC 1.63  10/19/20 Cr 0.72, K 3.3, TP 7.6, alb 3.7, WBC 8.59, Hgb 12.9, , ANC 7.13    IMAGIN/10/23: Right breast US- RONAL  07/10/23: Diagnostic right breast MMG- RONAL  2020 BL SC MG at [facility] - revealing an area of calcification sin the central right breast and a developing asymmetry in the left breast at 6:00 o'clock. BIRADS-0: additional imaging recommended    2020 BL DG MG and Left US at [facility] - revealing benign appearing scattered, punctate, calcifications in the central  right breast, a 1.1 cm oval mas with indistinct margins at 6 o'clock 3 cm FN in the left breast, and a 2.0 cm oval mass with indistinct margins and associated calcifications at 630 o'clock 4 cm FN. US of the left breast revealed 3 oval hypoechoic masses at 6 o'clock 3-4 cm FN, largest measuring 1.1 cm x 0.7 cm x 0.9 cm and two adjacent masses measuring 0.5 cm and 0.3 cm. There was also a 2.0 cm x 1.4 cm x 1.1 cm oval hypoechoic mass with indistinct margins at 630 o'clock 4 cm FN with associated echogenic foci consistent with calcifications. BIRADS-4: suspicious and biopsy is recommended.    7/30/20 PET CT: Within the inferior portion of the left breast slightly right paramedian is an irregularly defined hypermetabolic mass which measures 2.0 x 1.0 cm on axial image 61 series 4 with SUV of 3.67. There are bilateral axillary mildly enlarged but nonpathologic appearing lymph nodes with normal fatty hilum and none of these show hypermetabolism. There is also no evidence of hypermetabolic lymphadenopathy within the internal mammary chain, mediastinum or hilum.  Right anterior fourth rib hypermetabolic sclerosis measures 10 mm x 5 mm with SUV of 4.36 on image 58 series 4.    7/30/2020, MRI of the brain no intracranial findings    9/14/2020, MRI of the breast:  6/18/2022 MMG right breast BIRADS 2- Benign  LEFT BREAST:   In the 6:30 left breast, 4 cm from the nipple, there is a 21 x 21 x 19 mm (axial image 89/128) oval, heterogeneously enhancing mass with irregular margins. Signal void consistent with a biopsy clip is associated with this known malignancy.   In the 6:00 left breast, 3 to 4 cm from the nipple, there are 5 oval, enhancing masses with noncircumscribed margins. These masses are as follows:   - 11 x 9 x 11 mm (axial image 91/128); there is a signal void consistent with a biopsy clip associated with this known malignancy.  - 6 x 4 x 6 mm (axial image 91/128)  - 10 x 4 x 7 mm (axial image 91/128)  - 6 x 6 x 6 mm  (axial image 91/128)  - 6 x 4 x 6 mm (axial image 93/128); this mass is the most posterior of the masses and is 9 mm anterior to the chest wall.  There is no skin thickening or nipple retraction. No axillary or internal mammary lymphadenopathy is identified.  OTHER: There is a 9 mm enhancing mass within the right anterior fourth rib. This corresponds to the hypermetabolic lesion seen on PET CT dated 7/30/2020. A trace right-sided pleural effusion is present.    10/14/20 PET/CT: Mild interval enlargement and increased FDG avidity of the left breast mass. More sclerotic appearance of the anterior right fourth rib lesion, with decrease hypermetabolic uptake, maximum SUV is 1.62    12/1/20 MUGA with LVEF 70%    2/1/21 MRI b/l breasts: no residual abnormal enhancement is seen in the left breast at the sites of the known malignancy in the 6:00 and 6:30 left breast, indicating a favorable response to neoadjuvant chemotherapy. Enhancing mass within the right anterior fourth rib is less conspicuous on the current exam. While the mass is nonspecific, metastatic disease remains in the differential.    6/10/21 MG KWABENA RT Breast: Diffuse calcifications in the right breast are without significant change. RONAL noted. F/U screening X 1 year (6/2022).    PATHOLOGY:  1. 6/26/2020 US-Guided Core Biopsy Left Breast 2 cm Mass 630 o'clock 4 cm FN - INVASIVE DUCTAL CARCINOMA, GRADE 3 WITH MICROCALCIFICATIONS  ER 0.00%, KS 0.00%, HER2 1+ on IHC/negative.  2. 6/26/2020 US-Guided Core Biopsy Left breast 1.1 cm Mass 6 o'clock 3-4 cm FN - INVASIVE DUCTAL CARCINOMA, GRADE 3 WITH MICROCALCIFICATIONS  ER 0.00%, KS 0.00%, HER2 1+ on IHC/negative.    2/22/21 left simple mastectomy: residual IDC G3, 3mm, 6 oclock position. Margins neg, closest is inferior/anterior at 10mm. No LVI. 2 axillary SLN negative. Positive response to NACT. ypT1a ypN0    Review of Systems  CONSTITUTIONAL: no fevers, no chills, no weight loss, no fatigue, no  weakness  HEMATOLOGIC: no abnormal bleeding, no abnormal bruising, no drenching night sweats  ONCOLOGIC: no new masses or lumps  HEENT: no vision loss, no tinnitus or hearing loss, no nose bleeding, no dysphagia, no odynophagia  CVS: no chest pain, no palpitations, no dyspnea on exertion  RESP: no shortness of breath, no hemoptysis, no cough  BREAST: no nipple discharge, no breast tenderness, no breast masses on self breast examination  GI: no nausea, no vomiting, no diarrhea, no constipation, no melena, no hematochezia, no hematemesis, no abdominal pain, no increase in abdominal girth  : no dysuria, no hematuria, no discharge  GYN: +abnormal vaginal bleeding, no dyspareunia, no vaginal discharge  INTEGUMENT: no rashes, no abnormal bruising, no nail pitting, no hyperpigmentation  NEURO: no falls, no memory loss, no paresthesias or dysesthesias, no urofecal incontinence or retention, no loss of strength on any extremity  MSK: no back pain, +pain in right shoulder, no joint swelling  PSYCH: no suicidal or homicidal ideation, no depression, no insomnia, no anhedonia  ENDOCRINE: no heat or cold intolerance, no polyuria, no polydipsia    Physical Exam  There were no vitals filed for this visit.      ECOG PS 2  GA: AAOx3, NAD  HEENT: NCAT, PERRLA, EOMI, fair dentition, no oral ulcers  LYMPH: no cervical, axillary or supraclavicular adenopathy  CVS: s1s2 RRR, no M/R/G  RESP: CTA b/l, no crackles, no wheezes or rhonchi  Breast: Left: s/p mastectomy, Right: no masses, lumps, no nipple inversion, no peau d'orange  ABD: soft, NT, ND, BS+, no hepatosplenomegaly  EXT: no deformities, + 2 BLE edema, no pedal edema  SKIN: no rashes, no bruises or purpura, warm and dry  NEURO: normal mentation, strength 5/5 on all 4 extremities, no sensory deficits    Assessment/Plan   1. Cancer of overlapping sites of left female breast C50.812  Multifocal left breast IDC G3 TN T2N0 w/ suspicious right lytic rib lesion  10/14/20 PET/CT: Mild  interval enlargement and increased FDG avidity of the left breast mass. More sclerotic appearance of the anterior right fourth rib lesion, with decreased hypermetabolic uptake, unchanged in comparison.  Mediport placed on 10/12/20 with Dr. Lorie RIVAS 12/1/20 with LVEF 70%  Completed 12 weeks of taxol  Post treatment MRI b/l breasts 2/1/21 shows complete response. Enhancing mass within the right anterior fourth rib is less conspicuous. While the mass is nonspecific, metastatic disease remains in the differential.  L simple mastectomy and SLNE with ypT1a ypN0 with extensive pathologic response and only minimal residual IDC of 3mm  She was referred to [facility], RT not recommended due to multiple comorbidities.  MD discussed possibility of adjuvant Xeloda. He was not sure how well she could tolerate it but we did offer it. She did not wish to try Xeloda and opted to proceed with surveillance monitoring.  MMG of right breast 6/21 normal. Repeat MMG Right breast due 1 year (6/2022), already ordered per surgery  Triple neg breast cancer, has still not done genetic counseling, (pt declines at this time)  C/w port flush    Plan  RTC in 6 months No labs needed  Continue f/u with Dr. Melo (orders MMG)  Patient is overdue for MMG, spoke with nursing home and they are scheduling ANNIE Orosco FNP-C

## 2025-08-06 NOTE — TELEPHONE ENCOUNTER
----- Message from Marli Orosco NP sent at 8/6/2025  1:47 PM CDT -----  Awesome thank you  ----- Message -----  From: Zarina Gómez MA  Sent: 8/6/2025   1:41 PM CDT  To: Marli Orosco NP    Spoke to Fareed at [facility] she states she will get patients MMG scheduled and will fax over results.    Thank you   Zarina

## 2025-08-08 LAB — BCS RECOMMENDATION EXT: NORMAL

## 2025-08-14 ENCOUNTER — OFFICE VISIT (OUTPATIENT)
Dept: SURGERY | Facility: CLINIC | Age: 75
End: 2025-08-14
Payer: MEDICARE

## 2025-08-14 VITALS
HEART RATE: 68 BPM | OXYGEN SATURATION: 99 % | HEIGHT: 63 IN | SYSTOLIC BLOOD PRESSURE: 110 MMHG | TEMPERATURE: 99 F | DIASTOLIC BLOOD PRESSURE: 74 MMHG | BODY MASS INDEX: 26.05 KG/M2 | RESPIRATION RATE: 18 BRPM | WEIGHT: 147 LBS

## 2025-08-14 DIAGNOSIS — Z08 ENCOUNTER FOR FOLLOW-UP SURVEILLANCE OF BREAST CANCER: ICD-10-CM

## 2025-08-14 DIAGNOSIS — Z85.3 ENCOUNTER FOR FOLLOW-UP SURVEILLANCE OF BREAST CANCER: ICD-10-CM

## 2025-08-14 DIAGNOSIS — Z90.12 HISTORY OF LEFT MASTECTOMY: Primary | ICD-10-CM

## 2025-08-14 PROCEDURE — 99999 PR PBB SHADOW E&M-EST. PATIENT-LVL IV: CPT | Mod: PBBFAC,,, | Performed by: PHYSICIAN ASSISTANT

## 2025-08-14 PROCEDURE — 3008F BODY MASS INDEX DOCD: CPT | Mod: CPTII,S$GLB,, | Performed by: PHYSICIAN ASSISTANT

## 2025-08-14 PROCEDURE — 1159F MED LIST DOCD IN RCRD: CPT | Mod: CPTII,S$GLB,, | Performed by: PHYSICIAN ASSISTANT

## 2025-08-14 PROCEDURE — 3078F DIAST BP <80 MM HG: CPT | Mod: CPTII,S$GLB,, | Performed by: PHYSICIAN ASSISTANT

## 2025-08-14 PROCEDURE — 3074F SYST BP LT 130 MM HG: CPT | Mod: CPTII,S$GLB,, | Performed by: PHYSICIAN ASSISTANT

## 2025-08-14 PROCEDURE — 4010F ACE/ARB THERAPY RXD/TAKEN: CPT | Mod: CPTII,S$GLB,, | Performed by: PHYSICIAN ASSISTANT

## 2025-08-14 PROCEDURE — 1126F AMNT PAIN NOTED NONE PRSNT: CPT | Mod: CPTII,S$GLB,, | Performed by: PHYSICIAN ASSISTANT

## 2025-08-14 PROCEDURE — 99214 OFFICE O/P EST MOD 30 MIN: CPT | Mod: S$GLB,,, | Performed by: PHYSICIAN ASSISTANT
